# Patient Record
Sex: MALE | Race: WHITE | URBAN - METROPOLITAN AREA
[De-identification: names, ages, dates, MRNs, and addresses within clinical notes are randomized per-mention and may not be internally consistent; named-entity substitution may affect disease eponyms.]

---

## 2018-04-18 ENCOUNTER — TRANSFERRED RECORDS (OUTPATIENT)
Dept: HEALTH INFORMATION MANAGEMENT | Facility: CLINIC | Age: 83
End: 2018-04-18

## 2018-05-04 ENCOUNTER — ANESTHESIA EVENT (OUTPATIENT)
Dept: SURGERY | Facility: CLINIC | Age: 83
End: 2018-05-04
Payer: MEDICARE

## 2018-05-04 ENCOUNTER — HOSPITAL ENCOUNTER (OUTPATIENT)
Facility: CLINIC | Age: 83
Discharge: HOME | End: 2018-05-04
Attending: INTERNAL MEDICINE | Admitting: INTERNAL MEDICINE
Payer: MEDICARE

## 2018-05-04 ENCOUNTER — ANESTHESIA (OUTPATIENT)
Dept: SURGERY | Facility: CLINIC | Age: 83
End: 2018-05-04
Payer: MEDICARE

## 2018-05-04 VITALS
HEART RATE: 83 BPM | RESPIRATION RATE: 16 BRPM | TEMPERATURE: 97.6 F | WEIGHT: 118 LBS | OXYGEN SATURATION: 97 % | SYSTOLIC BLOOD PRESSURE: 107 MMHG | DIASTOLIC BLOOD PRESSURE: 59 MMHG | BODY MASS INDEX: 18.52 KG/M2 | HEIGHT: 67 IN

## 2018-05-04 LAB — UPPER GI ENDOSCOPY: NORMAL

## 2018-05-04 PROCEDURE — 71000027 ZZH RECOVERY PHASE 2 EACH 15 MINS: Performed by: INTERNAL MEDICINE

## 2018-05-04 PROCEDURE — 88305 TISSUE EXAM BY PATHOLOGIST: CPT | Performed by: INTERNAL MEDICINE

## 2018-05-04 PROCEDURE — 25000125 ZZHC RX 250: Performed by: ANESTHESIOLOGY

## 2018-05-04 PROCEDURE — 40000306 ZZH STATISTIC PRE PROC ASSESS II: Performed by: INTERNAL MEDICINE

## 2018-05-04 PROCEDURE — 25000125 ZZHC RX 250: Performed by: NURSE ANESTHETIST, CERTIFIED REGISTERED

## 2018-05-04 PROCEDURE — 88305 TISSUE EXAM BY PATHOLOGIST: CPT | Mod: 26 | Performed by: INTERNAL MEDICINE

## 2018-05-04 PROCEDURE — 36000052 ZZH SURGERY LEVEL 2 EA 15 ADDTL MIN: Performed by: INTERNAL MEDICINE

## 2018-05-04 PROCEDURE — 27210794 ZZH OR GENERAL SUPPLY STERILE: Performed by: INTERNAL MEDICINE

## 2018-05-04 PROCEDURE — 37000009 ZZH ANESTHESIA TECHNICAL FEE, EACH ADDTL 15 MIN: Performed by: INTERNAL MEDICINE

## 2018-05-04 PROCEDURE — 36000050 ZZH SURGERY LEVEL 2 1ST 30 MIN: Performed by: INTERNAL MEDICINE

## 2018-05-04 PROCEDURE — 25000128 H RX IP 250 OP 636: Performed by: NURSE ANESTHETIST, CERTIFIED REGISTERED

## 2018-05-04 PROCEDURE — 40000063 ZZH STATISTIC EGD (OR PROCEDURE): Performed by: INTERNAL MEDICINE

## 2018-05-04 PROCEDURE — 37000008 ZZH ANESTHESIA TECHNICAL FEE, 1ST 30 MIN: Performed by: INTERNAL MEDICINE

## 2018-05-04 PROCEDURE — 25000128 H RX IP 250 OP 636: Performed by: INTERNAL MEDICINE

## 2018-05-04 PROCEDURE — 25000128 H RX IP 250 OP 636: Performed by: ANESTHESIOLOGY

## 2018-05-04 RX ORDER — FENTANYL CITRATE 50 UG/ML
INJECTION, SOLUTION INTRAMUSCULAR; INTRAVENOUS PRN
Status: DISCONTINUED | OUTPATIENT
Start: 2018-05-04 | End: 2018-05-04

## 2018-05-04 RX ORDER — NALOXONE HYDROCHLORIDE 0.4 MG/ML
.1-.4 INJECTION, SOLUTION INTRAMUSCULAR; INTRAVENOUS; SUBCUTANEOUS
Status: DISCONTINUED | OUTPATIENT
Start: 2018-05-04 | End: 2018-05-04 | Stop reason: HOSPADM

## 2018-05-04 RX ORDER — FLUMAZENIL 0.1 MG/ML
0.2 INJECTION, SOLUTION INTRAVENOUS
Status: DISCONTINUED | OUTPATIENT
Start: 2018-05-04 | End: 2018-05-04 | Stop reason: HOSPADM

## 2018-05-04 RX ORDER — EPHEDRINE SULFATE 50 MG/ML
INJECTION, SOLUTION INTRAMUSCULAR; INTRAVENOUS; SUBCUTANEOUS PRN
Status: DISCONTINUED | OUTPATIENT
Start: 2018-05-04 | End: 2018-05-04

## 2018-05-04 RX ORDER — PROPOFOL 10 MG/ML
INJECTION, EMULSION INTRAVENOUS PRN
Status: DISCONTINUED | OUTPATIENT
Start: 2018-05-04 | End: 2018-05-04

## 2018-05-04 RX ORDER — ONDANSETRON 2 MG/ML
4 INJECTION INTRAMUSCULAR; INTRAVENOUS EVERY 30 MIN PRN
Status: DISCONTINUED | OUTPATIENT
Start: 2018-05-04 | End: 2018-05-04 | Stop reason: HOSPADM

## 2018-05-04 RX ORDER — HYDROMORPHONE HYDROCHLORIDE 1 MG/ML
.3-.5 INJECTION, SOLUTION INTRAMUSCULAR; INTRAVENOUS; SUBCUTANEOUS EVERY 10 MIN PRN
Status: DISCONTINUED | OUTPATIENT
Start: 2018-05-04 | End: 2018-05-04 | Stop reason: HOSPADM

## 2018-05-04 RX ORDER — ONDANSETRON 2 MG/ML
4 INJECTION INTRAMUSCULAR; INTRAVENOUS EVERY 6 HOURS PRN
Status: DISCONTINUED | OUTPATIENT
Start: 2018-05-04 | End: 2018-05-04 | Stop reason: HOSPADM

## 2018-05-04 RX ORDER — ONDANSETRON 4 MG/1
4 TABLET, ORALLY DISINTEGRATING ORAL EVERY 30 MIN PRN
Status: DISCONTINUED | OUTPATIENT
Start: 2018-05-04 | End: 2018-05-04 | Stop reason: HOSPADM

## 2018-05-04 RX ORDER — FENTANYL CITRATE 50 UG/ML
25-50 INJECTION, SOLUTION INTRAMUSCULAR; INTRAVENOUS
Status: DISCONTINUED | OUTPATIENT
Start: 2018-05-04 | End: 2018-05-04 | Stop reason: HOSPADM

## 2018-05-04 RX ORDER — MEPERIDINE HYDROCHLORIDE 25 MG/ML
12.5 INJECTION INTRAMUSCULAR; INTRAVENOUS; SUBCUTANEOUS
Status: DISCONTINUED | OUTPATIENT
Start: 2018-05-04 | End: 2018-05-04 | Stop reason: HOSPADM

## 2018-05-04 RX ORDER — KETAMINE HYDROCHLORIDE 10 MG/ML
INJECTION INTRAMUSCULAR; INTRAVENOUS PRN
Status: DISCONTINUED | OUTPATIENT
Start: 2018-05-04 | End: 2018-05-04

## 2018-05-04 RX ORDER — DEXAMETHASONE SODIUM PHOSPHATE 4 MG/ML
INJECTION, SOLUTION INTRA-ARTICULAR; INTRALESIONAL; INTRAMUSCULAR; INTRAVENOUS; SOFT TISSUE PRN
Status: DISCONTINUED | OUTPATIENT
Start: 2018-05-04 | End: 2018-05-04

## 2018-05-04 RX ORDER — SODIUM CHLORIDE, SODIUM LACTATE, POTASSIUM CHLORIDE, CALCIUM CHLORIDE 600; 310; 30; 20 MG/100ML; MG/100ML; MG/100ML; MG/100ML
INJECTION, SOLUTION INTRAVENOUS CONTINUOUS
Status: DISCONTINUED | OUTPATIENT
Start: 2018-05-04 | End: 2018-05-04 | Stop reason: HOSPADM

## 2018-05-04 RX ORDER — PROPOFOL 10 MG/ML
INJECTION, EMULSION INTRAVENOUS CONTINUOUS PRN
Status: DISCONTINUED | OUTPATIENT
Start: 2018-05-04 | End: 2018-05-04

## 2018-05-04 RX ORDER — ONDANSETRON 4 MG/1
4 TABLET, ORALLY DISINTEGRATING ORAL EVERY 6 HOURS PRN
Status: DISCONTINUED | OUTPATIENT
Start: 2018-05-04 | End: 2018-05-04 | Stop reason: HOSPADM

## 2018-05-04 RX ORDER — LIDOCAINE 40 MG/G
CREAM TOPICAL
Status: DISCONTINUED | OUTPATIENT
Start: 2018-05-04 | End: 2018-05-04 | Stop reason: HOSPADM

## 2018-05-04 RX ORDER — ONDANSETRON 2 MG/ML
4 INJECTION INTRAMUSCULAR; INTRAVENOUS
Status: COMPLETED | OUTPATIENT
Start: 2018-05-04 | End: 2018-05-04

## 2018-05-04 RX ORDER — HYDRALAZINE HYDROCHLORIDE 20 MG/ML
2.5-5 INJECTION INTRAMUSCULAR; INTRAVENOUS EVERY 10 MIN PRN
Status: DISCONTINUED | OUTPATIENT
Start: 2018-05-04 | End: 2018-05-04 | Stop reason: HOSPADM

## 2018-05-04 RX ADMIN — Medication 10 MG: at 11:10

## 2018-05-04 RX ADMIN — MIDAZOLAM 1 MG: 1 INJECTION INTRAMUSCULAR; INTRAVENOUS at 10:48

## 2018-05-04 RX ADMIN — Medication 15 MG: at 11:25

## 2018-05-04 RX ADMIN — ONDANSETRON 4 MG: 2 INJECTION INTRAMUSCULAR; INTRAVENOUS at 10:53

## 2018-05-04 RX ADMIN — PROPOFOL 20 MG: 10 INJECTION, EMULSION INTRAVENOUS at 10:55

## 2018-05-04 RX ADMIN — FENTANYL CITRATE 25 MCG: 50 INJECTION, SOLUTION INTRAMUSCULAR; INTRAVENOUS at 10:59

## 2018-05-04 RX ADMIN — SODIUM CHLORIDE, POTASSIUM CHLORIDE, SODIUM LACTATE AND CALCIUM CHLORIDE: 600; 310; 30; 20 INJECTION, SOLUTION INTRAVENOUS at 10:48

## 2018-05-04 RX ADMIN — PROPOFOL 10 MG: 10 INJECTION, EMULSION INTRAVENOUS at 10:57

## 2018-05-04 RX ADMIN — PROPOFOL 10 MG: 10 INJECTION, EMULSION INTRAVENOUS at 10:59

## 2018-05-04 RX ADMIN — DEXAMETHASONE SODIUM PHOSPHATE 4 MG: 4 INJECTION, SOLUTION INTRA-ARTICULAR; INTRALESIONAL; INTRAMUSCULAR; INTRAVENOUS; SOFT TISSUE at 10:53

## 2018-05-04 RX ADMIN — Medication 10 MG: at 11:15

## 2018-05-04 RX ADMIN — LIDOCAINE HYDROCHLORIDE 30 MG: 10 INJECTION, SOLUTION EPIDURAL; INFILTRATION; INTRACAUDAL; PERINEURAL at 10:49

## 2018-05-04 RX ADMIN — MIDAZOLAM 0.5 MG: 1 INJECTION INTRAMUSCULAR; INTRAVENOUS at 10:59

## 2018-05-04 RX ADMIN — Medication 10 MG: at 11:20

## 2018-05-04 RX ADMIN — PROPOFOL 75 MCG/KG/MIN: 10 INJECTION, EMULSION INTRAVENOUS at 10:48

## 2018-05-04 RX ADMIN — FENTANYL CITRATE 50 MCG: 50 INJECTION, SOLUTION INTRAMUSCULAR; INTRAVENOUS at 10:48

## 2018-05-04 RX ADMIN — KETAMINE HYDROCHLORIDE 20 MG: 10 INJECTION, SOLUTION INTRAMUSCULAR; INTRAVENOUS at 10:49

## 2018-05-04 ASSESSMENT — COPD QUESTIONNAIRES: COPD: 1

## 2018-05-04 NOTE — ANESTHESIA PREPROCEDURE EVALUATION
Anesthesia Evaluation     . Pt has had prior anesthetic. Type: General    No history of anesthetic complications          ROS/MED HX    ENT/Pulmonary:     (+)COPD, , . .    Neurologic:  - neg neurologic ROS     Cardiovascular:     (+) hypertension----. : . . . :. .       METS/Exercise Tolerance:     Hematologic:  - neg hematologic  ROS       Musculoskeletal:  - neg musculoskeletal ROS       GI/Hepatic:  - neg GI/hepatic ROS       Renal/Genitourinary:  - ROS Renal section negative       Endo:  - neg endo ROS       Psychiatric:  - neg psychiatric ROS       Infectious Disease:  - neg infectious disease ROS       Malignancy:      - no malignancy   Other:    - neg other ROS                 Physical Exam  Normal systems: cardiovascular, pulmonary and dental    Airway   Mallampati: II  TM distance: >3 FB  Neck ROM: full    Dental     Cardiovascular       Pulmonary                     Anesthesia Plan      History & Physical Review  History and physical reviewed and following examination; no interval change.    ASA Status:  2 .    NPO Status:  > 8 hours    Plan for MAC   PONV prophylaxis:  Ondansetron (or other 5HT-3)       Postoperative Care  Postoperative pain management:  IV analgesics and Oral pain medications.      Consents  Anesthetic plan, risks, benefits and alternatives discussed with:  Patient or representative and Patient..                          .

## 2018-05-04 NOTE — ANESTHESIA CARE TRANSFER NOTE
Patient: Hussein Hayes    Procedure(s):  ESOPHAGOSCOPY, GASTROSCOPY, DUODENOSCOPY (EGD) ([facility]), Duodenal mass biopsies  - Wound Class: II-Clean Contaminated    Diagnosis: Mass   Diagnosis Additional Information: No value filed.    Anesthesia Type:   MAC     Note:  Airway :Nasal Cannula  Patient transferred to:Phase II  Handoff Report: Identifed the Patient, Identified the Reponsible Provider, Reviewed the pertinent medical history, Discussed the surgical course, Reviewed Intra-OP anesthesia mangement and issues during anesthesia, Set expectations for post-procedure period and Allowed opportunity for questions and acknowledgement of understanding      Vitals: (Last set prior to Anesthesia Care Transfer)    CRNA VITALS  5/4/2018 1047 - 5/4/2018 1125      5/4/2018             Pulse: 61    SpO2: 99 %                Electronically Signed By: KALEB Stewart CRNA  May 4, 2018  11:25 AM

## 2018-05-04 NOTE — ANESTHESIA POSTPROCEDURE EVALUATION
Patient: Hussein Hayes    Procedure(s):  ESOPHAGOSCOPY, GASTROSCOPY, DUODENOSCOPY (EGD) ([facility]), Duodenal mass biopsies  - Wound Class: II-Clean Contaminated    Diagnosis:Mass   Diagnosis Additional Information: - Normal esophagus.   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     Anesthesia Type:  MAC    Note:  Anesthesia Post Evaluation    Patient location during evaluation: PACU  Patient participation: Able to fully participate in evaluation  Level of consciousness: awake and alert  Pain management: adequate  Airway patency: patent  Cardiovascular status: acceptable  Respiratory status: acceptable  Hydration status: acceptable  PONV: none     Anesthetic complications: None          Last vitals:  Vitals:    05/04/18 1150 05/04/18 1200 05/04/18 1234   BP: 112/59 113/64 107/59   Pulse:      Resp: 15 16 16   Temp:  97.6  F (36.4  C)    SpO2: 94% 95% 97%         Electronically Signed By: Corky Zaidi MD  May 4, 2018  1:07 PM

## 2018-05-04 NOTE — IP AVS SNAPSHOT
MRN:[id number]                      After Visit Summary   5/4/2018    Hussein Hayes    MRN: [id number]           Thank you!     Thank you for choosing [facility] for your care. Our goal is always to provide you with excellent care. Hearing back from our patients is one way we can continue to improve our services. Please take a few minutes to complete the written survey that you may receive in the mail after you visit. If you would like to speak to someone directly about your visit please contact Patient Relations at [phone]. Thank you!          Patient Information     Date Of Birth          1935        Designated Caregiver       Most Recent Value    Caregiver    Will someone help with your care after discharge? yes    Name of designated caregiver wife and daughter    Phone number of caregiver home      About your hospital stay     You were admitted on:  May 4, 2018 You last received care in the:  [facility] PreOP/PostOP    You were discharged on:  May 4, 2018       Who to Call     For medical emergencies, please call 911.  For non-urgent questions about your medical care, please call your primary care provider or clinic, None  For questions related to your surgery, please call your surgery clinic        Attending Provider     Provider Specialty    Dioni, Randall MILTON MD Gastroenterology       Primary Care Provider Fax #    [facility] [phone]      Further instructions from your care team       DR. RANDALL NARANJO M.D.   CLINIC PHONE NUMBER:  [phone]      ESOPHAGOGASTRODUODENOSCOPY DISCHARGE INSTRUCTIONS    You may not drive, use heavy equipment or consume alcohol for 24 hours because the drugs you were given may cause dizziness, drowsiness, forgetfulness and slower reaction time.    Small pieces or tissue (biopsies) or polyps may have been removed.    You may resume your regular diet and medications. Exception: If you had a biopsy or  polypectomy, do not take aspirin, aleve (naproxen) or ibuprofen for the next 10 days.  Tylenol (acetaminophen) is safe to take.    Additional instructions:  If you had a biopsy or polypectomy, the pathology report will be sent to your doctor.  If you have not received the results within 10 days, call your doctor's office.    What to watch for:  Problems rarely occur after the procedure.  It is important for you to be aware of the early signs of a possible complication.  Call immediately if you notice any of the followin.  Unusual pain or difficulty swallowing.    2.  Unusual abdominal or chest pain.    3.  Vomiting of blood.    4.  Black or bloody stools.    5.  Temperature above 100.6 degrees F         GENERAL ANESTHESIA OR SEDATION ADULT DISCHARGE INSTRUCTIONS   SPECIAL PRECAUTIONS FOR 24 HOURS AFTER SURGERY    IT IS NOT UNUSUAL TO FEEL LIGHT-HEADED OR FAINT, UP TO 24 HOURS AFTER SURGERY OR WHILE TAKING PAIN MEDICATION.  IF YOU HAVE THESE SYMPTOMS; SIT FOR A FEW MINUTES BEFORE STANDING AND HAVE SOMEONE ASSIST YOU WHEN YOU GET UP TO WALK OR USE THE BATHROOM.    YOU SHOULD REST AND RELAX FOR THE NEXT 24 HOURS AND YOU MUST MAKE ARRANGEMENTS TO HAVE SOMEONE STAY WITH YOU FOR AT LEAST 24 HOURS AFTER YOUR DISCHARGE.  AVOID HAZARDOUS AND STRENUOUS ACTIVITIES.  DO NOT MAKE IMPORTANT DECISIONS FOR 24 HOURS.    DO NOT DRIVE ANY VEHICLE OR OPERATE MECHANICAL EQUIPMENT FOR 24 HOURS FOLLOWING THE END OF YOUR SURGERY.  EVEN THOUGH YOU MAY FEEL NORMAL, YOUR REACTIONS MAY BE AFFECTED BY THE MEDICATION YOU HAVE RECEIVED.    DO NOT DRINK ALCOHOLIC BEVERAGES FOR 24 HOURS FOLLOWING YOUR SURGERY.    DRINK CLEAR LIQUIDS (APPLE JUICE, GINGER ALE, 7-UP, BROTH, ETC.).  PROGRESS TO YOUR REGULAR DIET AS YOU FEEL ABLE.    YOU MAY HAVE A DRY MOUTH, A SORE THROAT, MUSCLES ACHES OR TROUBLE SLEEPING.  THESE SHOULD GO AWAY AFTER 24 HOURS.    CALL YOUR DOCTOR FOR ANY OF THE FOLLOWING:  SIGNS OF INFECTION (FEVER, GROWING TENDERNESS AT THE  "SURGERY SITE, A LARGE AMOUNT OF DRAINAGE OR BLEEDING, SEVERE PAIN, FOUL-SMELLING DRAINAGE, REDNESS OR SWELLING.    IT HAS BEEN OVER 8 TO 10 HOURS SINCE SURGERY AND YOU ARE STILL NOT ABLE TO URINATE (PASS WATER).       Pending Results     Date and Time Order Name Status Description    2018 1101 Surgical pathology exam In process             Admission Information     Date & Time Provider Department Dept. Phone    2018 Link, Randall MILTON MD [facility] PreOP/PostOP [phone]      Your Vitals Were     Blood Pressure Pulse Temperature Respirations Height Weight    115/50 83 97.2  F (36.2  C) (Temporal) 13 1.702 m (5' 7\") 53.5 kg (118 lb)    Pulse Oximetry BMI (Body Mass Index)                99% 18.48 kg/m2          StatusPagehart Information     Sviral lets you send messages to your doctor, view your test results, renew your prescriptions, schedule appointments and more. To sign up, go to [email or web address] . Click on \"Log in\" on the left side of the screen, which will take you to the Welcome page. Then click on \"Sign up Now\" on the right side of the page.     You will be asked to enter the access code listed below, as well as some personal information. Please follow the directions to create your username and password.     Your access code is: [email or web address]  Expires: 2018 11:36 AM     Your access code will  in 90 days. If you need help or a new code, please call your [place] clinic or [phone].        Care EveryWhere ID     This is your Care EveryWhere ID. This could be used by other organizations to access your [place] medical records  [id number]        Equal Access to Services     CHEMA MAYER : Hadii priyank Flanagan, leah oshea, clara pierce. So [facility] [phone].    ATENCIÓN: Si habla español, tiene a coffey disposición servicios gratuitos de asistencia lingüística. Llame al [phone].    We comply with applicable federal " civil rights laws and [place] laws. We do not discriminate on the basis of race, color, national origin, age, disability, sex, sexual orientation, or gender identity.               Review of your medicines      UNREVIEWED medicines. Ask your doctor about these medicines        Dose / Directions    LISINOPRIL PO        Dose:  25 mg   Take 25 mg by mouth daily   Refills:  0       umeclidinium-vilanterol 62.5-25 MCG/INH oral inhaler   Commonly known as:  ANORO ELLIPTA        Dose:  1 puff   Inhale 1 puff into the lungs daily   Refills:  0                Protect others around you: Learn how to safely use, store and throw away your medicines at www.disposemymeds.org.             Medication List: This is a list of all your medications and when to take them. Check marks below indicate your daily home schedule. Keep this list as a reference.      Medications           Morning Afternoon Evening Bedtime As Needed    LISINOPRIL PO   Take 25 mg by mouth daily                                umeclidinium-vilanterol 62.5-25 MCG/INH oral inhaler   Commonly known as:  ANORO ELLIPTA   Inhale 1 puff into the lungs daily

## 2018-05-04 NOTE — DISCHARGE INSTRUCTIONS
DR. FARHAN NARANJO M.D.   CLINIC PHONE NUMBER:  [phone]      ESOPHAGOGASTRODUODENOSCOPY DISCHARGE INSTRUCTIONS    You may not drive, use heavy equipment or consume alcohol for 24 hours because the drugs you were given may cause dizziness, drowsiness, forgetfulness and slower reaction time.    Small pieces or tissue (biopsies) or polyps may have been removed.    You may resume your regular diet and medications. Exception: If you had a biopsy or polypectomy, do not take aspirin, aleve (naproxen) or ibuprofen for the next 10 days.  Tylenol (acetaminophen) is safe to take.    Additional instructions:  If you had a biopsy or polypectomy, the pathology report will be sent to your doctor.  If you have not received the results within 10 days, call your doctor's office.    What to watch for:  Problems rarely occur after the procedure.  It is important for you to be aware of the early signs of a possible complication.  Call immediately if you notice any of the followin.  Unusual pain or difficulty swallowing.    2.  Unusual abdominal or chest pain.    3.  Vomiting of blood.    4.  Black or bloody stools.    5.  Temperature above 100.6 degrees F         GENERAL ANESTHESIA OR SEDATION ADULT DISCHARGE INSTRUCTIONS   SPECIAL PRECAUTIONS FOR 24 HOURS AFTER SURGERY    IT IS NOT UNUSUAL TO FEEL LIGHT-HEADED OR FAINT, UP TO 24 HOURS AFTER SURGERY OR WHILE TAKING PAIN MEDICATION.  IF YOU HAVE THESE SYMPTOMS; SIT FOR A FEW MINUTES BEFORE STANDING AND HAVE SOMEONE ASSIST YOU WHEN YOU GET UP TO WALK OR USE THE BATHROOM.    YOU SHOULD REST AND RELAX FOR THE NEXT 24 HOURS AND YOU MUST MAKE ARRANGEMENTS TO HAVE SOMEONE STAY WITH YOU FOR AT LEAST 24 HOURS AFTER YOUR DISCHARGE.  AVOID HAZARDOUS AND STRENUOUS ACTIVITIES.  DO NOT MAKE IMPORTANT DECISIONS FOR 24 HOURS.    DO NOT DRIVE ANY VEHICLE OR OPERATE MECHANICAL EQUIPMENT FOR 24 HOURS FOLLOWING THE END OF YOUR SURGERY.  EVEN THOUGH YOU MAY FEEL NORMAL, YOUR REACTIONS MAY BE  AFFECTED BY THE MEDICATION YOU HAVE RECEIVED.    DO NOT DRINK ALCOHOLIC BEVERAGES FOR 24 HOURS FOLLOWING YOUR SURGERY.    DRINK CLEAR LIQUIDS (APPLE JUICE, GINGER ALE, 7-UP, BROTH, ETC.).  PROGRESS TO YOUR REGULAR DIET AS YOU FEEL ABLE.    YOU MAY HAVE A DRY MOUTH, A SORE THROAT, MUSCLES ACHES OR TROUBLE SLEEPING.  THESE SHOULD GO AWAY AFTER 24 HOURS.    CALL YOUR DOCTOR FOR ANY OF THE FOLLOWING:  SIGNS OF INFECTION (FEVER, GROWING TENDERNESS AT THE SURGERY SITE, A LARGE AMOUNT OF DRAINAGE OR BLEEDING, SEVERE PAIN, FOUL-SMELLING DRAINAGE, REDNESS OR SWELLING.    IT HAS BEEN OVER 8 TO 10 HOURS SINCE SURGERY AND YOU ARE STILL NOT ABLE TO URINATE (PASS WATER).

## 2018-05-04 NOTE — IP AVS SNAPSHOT
[facility] PreOP/PostOP    [address]    [facility] [address]    Phone:  [phone]    Fax:  [phone]                                       After Visit Summary   5/4/2018    Hussein Hayes    MRN: [id number]           After Visit Summary Signature Page     I have received my discharge instructions, and my questions have been answered. I have discussed any challenges I see with this plan with the nurse or doctor.    ..........................................................................................................................................  Patient/Patient Representative Signature      ..........................................................................................................................................  Patient Representative Print Name and Relationship to Patient    ..................................................               ................................................  Date                                            Time    ..........................................................................................................................................  Reviewed by Signature/Title    ...................................................              ..............................................  Date                                                            Time

## 2018-05-07 LAB — COPATH REPORT: NORMAL

## 2018-05-09 ENCOUNTER — OFFICE VISIT (OUTPATIENT)
Dept: SURGERY | Facility: CLINIC | Age: 83
End: 2018-05-09
Payer: COMMERCIAL

## 2018-05-09 VITALS
BODY MASS INDEX: 18.52 KG/M2 | DIASTOLIC BLOOD PRESSURE: 58 MMHG | HEART RATE: 75 BPM | WEIGHT: 118 LBS | SYSTOLIC BLOOD PRESSURE: 100 MMHG | HEIGHT: 67 IN

## 2018-05-09 DIAGNOSIS — C17.0 ADENOCARCINOMA OF DUODENUM (H): Primary | ICD-10-CM

## 2018-05-09 PROCEDURE — 99204 OFFICE O/P NEW MOD 45 MIN: CPT | Performed by: SURGERY

## 2018-05-09 NOTE — MR AVS SNAPSHOT
"              After Visit Summary   2018    Hussein Hayes    MRN: [id number]           Patient Information     Date Of Birth          1935        Visit Information        Provider Department      2018 12:45 PM Kenneth Berg MD [facility] General Surgery       Follow-ups after your visit        Who to contact     If you have questions or need follow up information about today's clinic visit or your schedule please contact [facility] directly at [phone].  Normal or non-critical lab and imaging results will be communicated to you by [facility], letter or phone within 4 business days after the clinic has received the results. If you do not hear from us within 7 days, please contact the clinic through HQ plust or phone. If you have a critical or abnormal lab result, we will notify you by phone as soon as possible.  Submit refill requests through [employer] or call your pharmacy and they will forward the refill request to us. Please allow 3 business days for your refill to be completed.          Additional Information About Your Visit        MyChart Information     Con[employer]rics lets you send messages to your doctor, view your test results, renew your prescriptions, schedule appointments and more. To sign up, go to [email or web address] . Click on \"Log in\" on the left side of the screen, which will take you to the Welcome page. Then click on \"Sign up Now\" on the right side of the page.     You will be asked to enter the access code listed below, as well as some personal information. Please follow the directions to create your username and password.     Your access code is: [email or web address]  Expires: 2018 11:36 AM     Your access code will  in 90 days. If you need help or a new code, please call your [place] clinic or [phone].        Care EveryWhere ID     This is your Care EveryWhere ID. This could be used by other organizations to " "access your [place] medical records  [id number]        Your Vitals Were     Pulse Height BMI (Body Mass Index)             75 5' 7\" (1.702 m) 18.48 kg/m2          Blood Pressure from Last 3 Encounters:   05/09/18 100/58   05/04/18 107/59    Weight from Last 3 Encounters:   05/09/18 118 lb (53.5 kg)   05/04/18 118 lb (53.5 kg)              Today, you had the following     No orders found for display       Primary Care Provider Office Phone # Fax #    Kenneth G Pallas, [phone]       [facility] [id number] [facility] [address]        Equal Access to Services     ELOISA MAYER : Hadii priyank Flanagan, wagato oshea, qaybta kaalmada adeumuyaconsuelo, clara khan. So [facility] [phone].    ATENCIÓN: Si habla español, tiene a coffey disposición servicios gratuitos de asistencia lingüística. Llame al [phone].    We comply with applicable federal civil rights laws and [place] laws. We do not discriminate on the basis of race, color, national origin, age, disability, sex, sexual orientation, or gender identity.            Thank you!     Thank you for choosing [facility]  for your care. Our goal is always to provide you with excellent care. Hearing back from our patients is one way we can continue to improve our services. Please take a few minutes to complete the written survey that you may receive in the mail after your visit with us. Thank you!             Your Updated Medication List - Protect others around you: Learn how to safely use, store and throw away your medicines at www.disposemymeds.org.          This list is accurate as of 5/9/18  1:27 PM.  Always use your most recent med list.                   Brand Name Dispense Instructions for use Diagnosis    LISINOPRIL PO      Take 25 mg by mouth daily        umeclidinium-vilanterol 62.5-25 MCG/INH oral inhaler    ANORO ELLIPTA     Inhale 1 puff into the lungs daily        "

## 2018-05-09 NOTE — LETTER
"May 9, 2018    RE: Hussein Arellano, : 1935         HISTORY OF PRESENT ILLNESS:  Hussein Hayes is a 82 year old male who is seen in consultation at the request of Dr. Wheatley for evaluation of gastric outlet obstruction due to duodenal mass.  He began having symptoms shortly after Easter, nausea and vomits.  He has lost about 10-15 pounds.  CT and EGD shows duodenal mass, third portion, adenocarcinoma.  He is here today to discuss excision.     REVIEW OF SYSTEMS:  Constitutional:  Negative for chills, fatigue, fever.  Positive for weight loss.  Eyes:  Negative for new vision problems.  ENT:  Negative for ENT pain.  Cardiovascular:  Negative for chest pain, palpitations.  Respiratory:  Negative for cough, dyspnea.  Gastrointestinal:  Positive for abdominal pain, nausea, vomits  Musculoskeletal:  Negative for new arthralgias or myalgias.  Integumentary:  No rashes nor masses.     Family History has been reviewed.     There is no problem list on file for this patient.     Vtals: /58  Pulse 75  Ht 5' 7\" (1.702 m)  Wt 118 lb (53.5 kg)  BMI 18.48 kg/m2  BMI= Body mass index is 18.48 kg/(m^2).     EXAM:  GENERAL: healthy, alert and no distress   HEENT: moist mucus membranes, no scleral icterus  CARDIOVASCULAR:  RRR, No JVD  RESPIRATORY: non labored breathing  NECK: Neck supple. No noticeable masses.  ABDOMEN: soft, nontender,  Extremities: warm and well perfused, no edema  SKIN: No suspicious lesions or rashes      LABS/Imaging: reviewed EGD, CT and labs/path results with patient and family.     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  No current evidence of metastatic disease.     PLAN:  Pending oncology visit.  Likely proceed with abdominal exploration and distal duodenal resection, and feeding tube placement.     Hussein Hayes understands the risk, benefits, hopeful outcomes, and possible complications, both in the short and in the long term.  All his " questions answered, he will like to proceed with the propose procedure in the near future.     It is my pleasure to participate in the care of Hussein Hayes. Thank you for this consultation.      If you have any questions please give me a call.     Best regards,  MORGAN Nava

## 2018-05-12 NOTE — PROGRESS NOTES
[facility] Consultation    CHIEF COMPLAINT:  Chief Complaint   Patient presents with     Consult     Adenocarcinoma Duodenum mass       HISTORY OF PRESENT ILLNESS:  Hussein Hayes is a 82 year old male who is seen in consultation at the request of Dr. Wheatley for evaluation of gastric outlet obstruction due to duodenal mass.  He began having symptoms shortly after Easter, nausea and vomits.  He has lost about 10-15 pounds.  CT and EGD shows duodenal mass, third portion, adenocarcinoma.  He is here today to discuss excision.    REVIEW OF SYSTEMS:  Constitutional:  Negative for chills, fatigue, fever.  Positive for weight loss.  Eyes:  Negative for new vision problems.  ENT:  Negative for ENT pain.  Cardiovascular:  Negative for chest pain, palpitations.  Respiratory:  Negative for cough, dyspnea.  Gastrointestinal:  Positive for abdominal pain, nausea, vomits  Musculoskeletal:  Negative for new arthralgias or myalgias.  Integumentary:  No rashes nor masses.    Past Medical History:   Diagnosis Date     COPD (chronic obstructive pulmonary disease) (H)      Hypertension     No cardiologist       Past Surgical History:   Procedure Laterality Date     APPENDECTOMY       CATARACT IOL, RT/LT Bilateral      ESOPHAGOSCOPY, GASTROSCOPY, DUODENOSCOPY (EGD), COMBINED N/A 5/4/2018    Procedure: COMBINED ESOPHAGOSCOPY, GASTROSCOPY, DUODENOSCOPY (EGD);  Esophagoscopy, gastroscopy, duodenoscopy and duodenal mass biopsies ;  Surgeon: Randall Wheatley MD;  Location: [facility] OR       Family History has been reviewed.    Social History   Substance Use Topics     Smoking status: Former Smoker     Packs/day: 1.00     Years: 40.00     Types: Cigarettes     Smokeless tobacco: Never Used      Comment: quit 12 years ago     Alcohol use Not on file       There is no problem list on file for this patient.      Allergies   Allergen Reactions     Penicillins Hives       Current Outpatient Prescriptions   Medication Sig  "Dispense Refill     LISINOPRIL PO Take 25 mg by mouth daily       umeclidinium-vilanterol (ANORO ELLIPTA) 62.5-25 MCG/INH oral inhaler Inhale 1 puff into the lungs daily         Vitals: /58  Pulse 75  Ht 5' 7\" (1.702 m)  Wt 118 lb (53.5 kg)  BMI 18.48 kg/m2  BMI= Body mass index is 18.48 kg/(m^2).    EXAM:  GENERAL: healthy, alert and no distress   HEENT: moist mucus membranes, no scleral icterus  CARDIOVASCULAR:  RRR, No JVD  RESPIRATORY: non labored breathing  NECK: Neck supple. No noticeable masses.  ABDOMEN: soft, nontender,  Extremities: warm and well perfused, no edema  SKIN: No suspicious lesions or rashes      LABS/Imaging: reviewed EGD, CT and labs/path results with patient and family.    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  No current evidence of metastatic disease.    PLAN:  Pending oncology visit.  Likely proceed with abdominal exploration and distal duodenal resection, and feeding tube placement.    Hussein Hayes understands the risk, benefits, hopeful outcomes, and possible complications, both in the short and in the long term.  All his questions answered, he will like to proceed with the propose procedure in the near future.    It is my pleasure to participate in the care of Hussein Hayes. Thank you for this consultation.     If you have any questions please give me a call.    Best regards,  Kenneth Berg MD    Please route or send letter to:  Primary Care Provider (PCP), Referring Provider and Include Progress Note    Total time with patient visit: 45 minutes more than half spent in counseling, explanation of procedures and coordination of care.    "

## 2018-05-15 ENCOUNTER — TELEPHONE (OUTPATIENT)
Dept: SURGERY | Facility: CLINIC | Age: 83
End: 2018-05-15

## 2018-05-15 NOTE — TELEPHONE ENCOUNTER
Name of caller: Meaghan-  Nurse from [facility]    Reason for Call:  Questions about surgery    Surgeon:  Dr. Berg    Recent Surgery:  No    If yes, when & what type:  N/A      Best phone number to reach pt at is: [phone]  Ok to leave a message with medical info? No    Pharmacy preferred (if calling for a refill): n/a

## 2018-05-15 NOTE — TELEPHONE ENCOUNTER
Spoke with Meaghan, nurse from oncology who reports that the patient's daughter called her reporting that the patient has been having multiple episodes of emesis.  He is not even able to eat soft foods.  On Friday, he had 7 episodes of emesis.    He is only able to handle one serving of pedialyte. The daughter is reporting that he is becoming increasingly weak and that she does not think she could get him to come in for fluids.    They are looking for any recommendations from Dr. Berg, or possibly to be able to have the surgery before the scheduled date of June 1, due to his current condition.    Informed Meaghan that this will be discussed with Dr. Berg and any recommendations will be passed along to the family as well as oncology if warranted.    Also informed her that will recommend Dr. Berg call and talk to the patient's daughter directly.    Bridgett Lyn RN

## 2018-05-15 NOTE — TELEPHONE ENCOUNTER
Type of surgery: Distal duodectomy  Location of surgery: [facility]  Date and time of surgery: 6/1/18 at 12:20pm  Surgeon: Dr. Kenneth Berg  Pre-Op Appt Date: Patient to schedule  Post-Op Appt Date: Patient to schedule   Packet sent out: Yes  Pre-cert/Authorization completed:  Not Applicable  Date: 5/14/18

## 2018-05-22 RX ORDER — LISINOPRIL AND HYDROCHLOROTHIAZIDE 20; 25 MG/1; MG/1
1 TABLET ORAL DAILY
COMMUNITY
End: 2018-06-02

## 2018-05-23 ENCOUNTER — ANESTHESIA (OUTPATIENT)
Dept: SURGERY | Facility: CLINIC | Age: 83
DRG: 327 | End: 2018-05-23
Payer: MEDICARE

## 2018-05-23 ENCOUNTER — TRANSFERRED RECORDS (OUTPATIENT)
Dept: HEALTH INFORMATION MANAGEMENT | Facility: CLINIC | Age: 83
End: 2018-05-23

## 2018-05-23 ENCOUNTER — ANESTHESIA EVENT (OUTPATIENT)
Dept: SURGERY | Facility: CLINIC | Age: 83
DRG: 327 | End: 2018-05-23
Payer: MEDICARE

## 2018-05-23 ENCOUNTER — HOSPITAL ENCOUNTER (INPATIENT)
Facility: CLINIC | Age: 83
LOS: 10 days | Discharge: HOME | DRG: 327 | End: 2018-06-02
Attending: SURGERY | Admitting: HOSPITALIST
Payer: MEDICARE

## 2018-05-23 ENCOUNTER — APPOINTMENT (OUTPATIENT)
Dept: SURGERY | Facility: PHYSICIAN GROUP | Age: 83
End: 2018-05-23
Payer: COMMERCIAL

## 2018-05-23 DIAGNOSIS — R33.9 URINARY RETENTION: Primary | ICD-10-CM

## 2018-05-23 DIAGNOSIS — N30.00 ACUTE CYSTITIS WITHOUT HEMATURIA: ICD-10-CM

## 2018-05-23 DIAGNOSIS — I48.0 PAF (PAROXYSMAL ATRIAL FIBRILLATION) (H): ICD-10-CM

## 2018-05-23 PROBLEM — C80.1: Status: ACTIVE | Noted: 2018-05-23

## 2018-05-23 LAB
CREAT SERPL-MCNC: 1.29 MG/DL (ref 0.66–1.25)
GFR SERPL CREATININE-BSD FRML MDRD: 53 ML/MIN/1.7M2
HGB BLD-MCNC: 13.2 G/DL (ref 13.3–17.7)
PLATELET # BLD AUTO: 267 10E9/L (ref 150–450)
POTASSIUM SERPL-SCNC: 3.8 MMOL/L (ref 3.4–5.3)

## 2018-05-23 PROCEDURE — 2894A VOIDCORRECT: CPT | Mod: AS | Performed by: PHYSICIAN ASSISTANT

## 2018-05-23 PROCEDURE — 0D160ZA BYPASS STOMACH TO JEJUNUM, OPEN APPROACH: ICD-10-PCS | Performed by: SURGERY

## 2018-05-23 PROCEDURE — 25000128 H RX IP 250 OP 636: Performed by: PHYSICIAN ASSISTANT

## 2018-05-23 PROCEDURE — 25000125 ZZHC RX 250: Performed by: ANESTHESIOLOGY

## 2018-05-23 PROCEDURE — 25000128 H RX IP 250 OP 636: Performed by: ANESTHESIOLOGY

## 2018-05-23 PROCEDURE — 25000132 ZZH RX MED GY IP 250 OP 250 PS 637: Mod: GY | Performed by: PHYSICIAN ASSISTANT

## 2018-05-23 PROCEDURE — 43820 GASTROJEJUNOSTOMY WO VAGOTMY: CPT | Mod: 51 | Performed by: PHYSICIAN ASSISTANT

## 2018-05-23 PROCEDURE — 88341 IMHCHEM/IMCYTCHM EA ADD ANTB: CPT | Mod: 26 | Performed by: SURGERY

## 2018-05-23 PROCEDURE — 25000125 ZZHC RX 250: Performed by: SURGERY

## 2018-05-23 PROCEDURE — 37000009 ZZH ANESTHESIA TECHNICAL FEE, EACH ADDTL 15 MIN: Performed by: SURGERY

## 2018-05-23 PROCEDURE — 82565 ASSAY OF CREATININE: CPT | Performed by: ANESTHESIOLOGY

## 2018-05-23 PROCEDURE — 25000125 ZZHC RX 250: Performed by: NURSE ANESTHETIST, CERTIFIED REGISTERED

## 2018-05-23 PROCEDURE — 43820 GASTROJEJUNOSTOMY WO VAGOTMY: CPT | Mod: 51 | Performed by: SURGERY

## 2018-05-23 PROCEDURE — 84132 ASSAY OF SERUM POTASSIUM: CPT | Performed by: ANESTHESIOLOGY

## 2018-05-23 PROCEDURE — 36000093 ZZH SURGERY LEVEL 4 1ST 30 MIN: Performed by: SURGERY

## 2018-05-23 PROCEDURE — 25000132 ZZH RX MED GY IP 250 OP 250 PS 637: Mod: GY | Performed by: SURGERY

## 2018-05-23 PROCEDURE — 85049 AUTOMATED PLATELET COUNT: CPT | Performed by: ANESTHESIOLOGY

## 2018-05-23 PROCEDURE — 88342 IMHCHEM/IMCYTCHM 1ST ANTB: CPT | Mod: 26 | Performed by: SURGERY

## 2018-05-23 PROCEDURE — 37000008 ZZH ANESTHESIA TECHNICAL FEE, 1ST 30 MIN: Performed by: SURGERY

## 2018-05-23 PROCEDURE — 93010 ELECTROCARDIOGRAM REPORT: CPT | Performed by: INTERNAL MEDICINE

## 2018-05-23 PROCEDURE — 71000012 ZZH RECOVERY PHASE 1 LEVEL 1 FIRST HR: Performed by: SURGERY

## 2018-05-23 PROCEDURE — 25000128 H RX IP 250 OP 636: Performed by: SURGERY

## 2018-05-23 PROCEDURE — 85018 HEMOGLOBIN: CPT | Performed by: ANESTHESIOLOGY

## 2018-05-23 PROCEDURE — 27210995 ZZH RX 272: Performed by: SURGERY

## 2018-05-23 PROCEDURE — 36415 COLL VENOUS BLD VENIPUNCTURE: CPT | Performed by: ANESTHESIOLOGY

## 2018-05-23 PROCEDURE — 0DBV0ZX EXCISION OF MESENTERY, OPEN APPROACH, DIAGNOSTIC: ICD-10-PCS | Performed by: SURGERY

## 2018-05-23 PROCEDURE — A9270 NON-COVERED ITEM OR SERVICE: HCPCS | Mod: GY | Performed by: PHYSICIAN ASSISTANT

## 2018-05-23 PROCEDURE — 88341 IMHCHEM/IMCYTCHM EA ADD ANTB: CPT | Performed by: SURGERY

## 2018-05-23 PROCEDURE — 93005 ELECTROCARDIOGRAM TRACING: CPT

## 2018-05-23 PROCEDURE — 40000170 ZZH STATISTIC PRE-PROCEDURE ASSESSMENT II: Performed by: SURGERY

## 2018-05-23 PROCEDURE — 2894A VOIDCORRECT: CPT | Performed by: SURGERY

## 2018-05-23 PROCEDURE — 12000007 ZZH R&B INTERMEDIATE

## 2018-05-23 PROCEDURE — 88331 PATH CONSLTJ SURG 1 BLK 1SPC: CPT | Performed by: SURGERY

## 2018-05-23 PROCEDURE — A9270 NON-COVERED ITEM OR SERVICE: HCPCS | Mod: GY | Performed by: SURGERY

## 2018-05-23 PROCEDURE — 88331 PATH CONSLTJ SURG 1 BLK 1SPC: CPT | Mod: 26 | Performed by: SURGERY

## 2018-05-23 PROCEDURE — 88305 TISSUE EXAM BY PATHOLOGIST: CPT | Performed by: SURGERY

## 2018-05-23 PROCEDURE — 25000128 H RX IP 250 OP 636: Performed by: NURSE ANESTHETIST, CERTIFIED REGISTERED

## 2018-05-23 PROCEDURE — 25000566 ZZH SEVOFLURANE, EA 15 MIN: Performed by: SURGERY

## 2018-05-23 PROCEDURE — 88305 TISSUE EXAM BY PATHOLOGIST: CPT | Mod: 26 | Performed by: SURGERY

## 2018-05-23 PROCEDURE — 27210794 ZZH OR GENERAL SUPPLY STERILE: Performed by: SURGERY

## 2018-05-23 PROCEDURE — 36000063 ZZH SURGERY LEVEL 4 EA 15 ADDTL MIN: Performed by: SURGERY

## 2018-05-23 PROCEDURE — 88342 IMHCHEM/IMCYTCHM 1ST ANTB: CPT | Performed by: SURGERY

## 2018-05-23 RX ORDER — LIDOCAINE 40 MG/G
CREAM TOPICAL
Status: DISCONTINUED | OUTPATIENT
Start: 2018-05-23 | End: 2018-06-02 | Stop reason: HOSPADM

## 2018-05-23 RX ORDER — LABETALOL HYDROCHLORIDE 5 MG/ML
10 INJECTION, SOLUTION INTRAVENOUS
Status: DISCONTINUED | OUTPATIENT
Start: 2018-05-23 | End: 2018-05-23 | Stop reason: HOSPADM

## 2018-05-23 RX ORDER — ONDANSETRON 2 MG/ML
INJECTION INTRAMUSCULAR; INTRAVENOUS PRN
Status: DISCONTINUED | OUTPATIENT
Start: 2018-05-23 | End: 2018-05-23

## 2018-05-23 RX ORDER — CIPROFLOXACIN 2 MG/ML
400 INJECTION, SOLUTION INTRAVENOUS SEE ADMIN INSTRUCTIONS
Status: DISCONTINUED | OUTPATIENT
Start: 2018-05-23 | End: 2018-05-23 | Stop reason: HOSPADM

## 2018-05-23 RX ORDER — AMOXICILLIN 250 MG
1 CAPSULE ORAL 2 TIMES DAILY
Status: DISCONTINUED | OUTPATIENT
Start: 2018-05-23 | End: 2018-06-02 | Stop reason: HOSPADM

## 2018-05-23 RX ORDER — PROCHLORPERAZINE MALEATE 5 MG
5 TABLET ORAL EVERY 6 HOURS PRN
Status: DISCONTINUED | OUTPATIENT
Start: 2018-05-23 | End: 2018-06-02 | Stop reason: HOSPADM

## 2018-05-23 RX ORDER — FENTANYL CITRATE 50 UG/ML
INJECTION, SOLUTION INTRAMUSCULAR; INTRAVENOUS PRN
Status: DISCONTINUED | OUTPATIENT
Start: 2018-05-23 | End: 2018-05-23

## 2018-05-23 RX ORDER — PROPOFOL 10 MG/ML
INJECTION, EMULSION INTRAVENOUS PRN
Status: DISCONTINUED | OUTPATIENT
Start: 2018-05-23 | End: 2018-05-23

## 2018-05-23 RX ORDER — DIPHENHYDRAMINE HCL 12.5MG/5ML
12.5 LIQUID (ML) ORAL EVERY 6 HOURS PRN
Status: DISCONTINUED | OUTPATIENT
Start: 2018-05-23 | End: 2018-06-02 | Stop reason: HOSPADM

## 2018-05-23 RX ORDER — MAGNESIUM HYDROXIDE 1200 MG/15ML
LIQUID ORAL PRN
Status: DISCONTINUED | OUTPATIENT
Start: 2018-05-23 | End: 2018-05-23 | Stop reason: HOSPADM

## 2018-05-23 RX ORDER — LISINOPRIL AND HYDROCHLOROTHIAZIDE 20; 25 MG/1; MG/1
1 TABLET ORAL DAILY
Status: DISCONTINUED | OUTPATIENT
Start: 2018-05-24 | End: 2018-05-28

## 2018-05-23 RX ORDER — NEOSTIGMINE METHYLSULFATE 1 MG/ML
VIAL (ML) INJECTION PRN
Status: DISCONTINUED | OUTPATIENT
Start: 2018-05-23 | End: 2018-05-23

## 2018-05-23 RX ORDER — DEXAMETHASONE SODIUM PHOSPHATE 4 MG/ML
INJECTION, SOLUTION INTRA-ARTICULAR; INTRALESIONAL; INTRAMUSCULAR; INTRAVENOUS; SOFT TISSUE PRN
Status: DISCONTINUED | OUTPATIENT
Start: 2018-05-23 | End: 2018-05-23

## 2018-05-23 RX ORDER — ACETAMINOPHEN 325 MG/1
650 TABLET ORAL EVERY 4 HOURS PRN
Status: DISCONTINUED | OUTPATIENT
Start: 2018-05-26 | End: 2018-06-02 | Stop reason: HOSPADM

## 2018-05-23 RX ORDER — ACETAMINOPHEN 325 MG/1
975 TABLET ORAL EVERY 8 HOURS
Status: DISPENSED | OUTPATIENT
Start: 2018-05-23 | End: 2018-05-26

## 2018-05-23 RX ORDER — SODIUM CHLORIDE, SODIUM LACTATE, POTASSIUM CHLORIDE, CALCIUM CHLORIDE 600; 310; 30; 20 MG/100ML; MG/100ML; MG/100ML; MG/100ML
INJECTION, SOLUTION INTRAVENOUS CONTINUOUS
Status: DISCONTINUED | OUTPATIENT
Start: 2018-05-23 | End: 2018-05-24

## 2018-05-23 RX ORDER — DIPHENHYDRAMINE HYDROCHLORIDE 50 MG/ML
12.5 INJECTION INTRAMUSCULAR; INTRAVENOUS EVERY 6 HOURS PRN
Status: DISCONTINUED | OUTPATIENT
Start: 2018-05-23 | End: 2018-06-02 | Stop reason: HOSPADM

## 2018-05-23 RX ORDER — GLYCOPYRROLATE 0.2 MG/ML
INJECTION, SOLUTION INTRAMUSCULAR; INTRAVENOUS PRN
Status: DISCONTINUED | OUTPATIENT
Start: 2018-05-23 | End: 2018-05-23

## 2018-05-23 RX ORDER — ONDANSETRON 4 MG/1
4 TABLET, ORALLY DISINTEGRATING ORAL EVERY 6 HOURS PRN
Status: DISCONTINUED | OUTPATIENT
Start: 2018-05-23 | End: 2018-06-02 | Stop reason: HOSPADM

## 2018-05-23 RX ORDER — ACETAMINOPHEN 325 MG/1
975 TABLET ORAL ONCE
Status: COMPLETED | OUTPATIENT
Start: 2018-05-23 | End: 2018-05-23

## 2018-05-23 RX ORDER — SODIUM CHLORIDE, SODIUM LACTATE, POTASSIUM CHLORIDE, CALCIUM CHLORIDE 600; 310; 30; 20 MG/100ML; MG/100ML; MG/100ML; MG/100ML
INJECTION, SOLUTION INTRAVENOUS CONTINUOUS
Status: DISCONTINUED | OUTPATIENT
Start: 2018-05-23 | End: 2018-05-23 | Stop reason: HOSPADM

## 2018-05-23 RX ORDER — HYDROMORPHONE HYDROCHLORIDE 1 MG/ML
.3-.5 INJECTION, SOLUTION INTRAMUSCULAR; INTRAVENOUS; SUBCUTANEOUS EVERY 5 MIN PRN
Status: DISCONTINUED | OUTPATIENT
Start: 2018-05-23 | End: 2018-05-23 | Stop reason: HOSPADM

## 2018-05-23 RX ORDER — NALOXONE HYDROCHLORIDE 0.4 MG/ML
.1-.4 INJECTION, SOLUTION INTRAMUSCULAR; INTRAVENOUS; SUBCUTANEOUS
Status: DISCONTINUED | OUTPATIENT
Start: 2018-05-23 | End: 2018-06-02 | Stop reason: HOSPADM

## 2018-05-23 RX ORDER — CIPROFLOXACIN 2 MG/ML
400 INJECTION, SOLUTION INTRAVENOUS
Status: COMPLETED | OUTPATIENT
Start: 2018-05-23 | End: 2018-05-23

## 2018-05-23 RX ORDER — EPHEDRINE SULFATE 50 MG/ML
INJECTION, SOLUTION INTRAMUSCULAR; INTRAVENOUS; SUBCUTANEOUS PRN
Status: DISCONTINUED | OUTPATIENT
Start: 2018-05-23 | End: 2018-05-23

## 2018-05-23 RX ORDER — AMOXICILLIN 250 MG
2 CAPSULE ORAL 2 TIMES DAILY
Status: DISCONTINUED | OUTPATIENT
Start: 2018-05-23 | End: 2018-06-02 | Stop reason: HOSPADM

## 2018-05-23 RX ORDER — OXYCODONE HYDROCHLORIDE 5 MG/1
5-10 TABLET ORAL EVERY 4 HOURS PRN
Status: DISCONTINUED | OUTPATIENT
Start: 2018-05-23 | End: 2018-06-02 | Stop reason: HOSPADM

## 2018-05-23 RX ORDER — NALOXONE HYDROCHLORIDE 0.4 MG/ML
.1-.4 INJECTION, SOLUTION INTRAMUSCULAR; INTRAVENOUS; SUBCUTANEOUS
Status: DISCONTINUED | OUTPATIENT
Start: 2018-05-23 | End: 2018-05-23

## 2018-05-23 RX ORDER — LIDOCAINE HYDROCHLORIDE 20 MG/ML
INJECTION, SOLUTION INFILTRATION; PERINEURAL PRN
Status: DISCONTINUED | OUTPATIENT
Start: 2018-05-23 | End: 2018-05-23

## 2018-05-23 RX ORDER — ONDANSETRON 4 MG/1
4 TABLET, ORALLY DISINTEGRATING ORAL EVERY 30 MIN PRN
Status: DISCONTINUED | OUTPATIENT
Start: 2018-05-23 | End: 2018-05-23 | Stop reason: HOSPADM

## 2018-05-23 RX ORDER — ONDANSETRON 2 MG/ML
4 INJECTION INTRAMUSCULAR; INTRAVENOUS EVERY 30 MIN PRN
Status: DISCONTINUED | OUTPATIENT
Start: 2018-05-23 | End: 2018-05-23 | Stop reason: HOSPADM

## 2018-05-23 RX ORDER — ONDANSETRON 2 MG/ML
4 INJECTION INTRAMUSCULAR; INTRAVENOUS EVERY 6 HOURS PRN
Status: DISCONTINUED | OUTPATIENT
Start: 2018-05-23 | End: 2018-06-02 | Stop reason: HOSPADM

## 2018-05-23 RX ORDER — FENTANYL CITRATE 50 UG/ML
25-50 INJECTION, SOLUTION INTRAMUSCULAR; INTRAVENOUS EVERY 5 MIN PRN
Status: DISCONTINUED | OUTPATIENT
Start: 2018-05-23 | End: 2018-05-23 | Stop reason: HOSPADM

## 2018-05-23 RX ADMIN — Medication 5 MG: at 12:45

## 2018-05-23 RX ADMIN — LIDOCAINE HYDROCHLORIDE 1 ML: 10 INJECTION, SOLUTION EPIDURAL; INFILTRATION; INTRACAUDAL; PERINEURAL at 09:29

## 2018-05-23 RX ADMIN — NEOSTIGMINE METHYLSULFATE 4 MG: 1 INJECTION, SOLUTION INTRAVENOUS at 13:23

## 2018-05-23 RX ADMIN — SODIUM CHLORIDE, POTASSIUM CHLORIDE, SODIUM LACTATE AND CALCIUM CHLORIDE: 600; 310; 30; 20 INJECTION, SOLUTION INTRAVENOUS at 14:32

## 2018-05-23 RX ADMIN — CIPROFLOXACIN 400 MG: 2 INJECTION INTRAVENOUS at 11:53

## 2018-05-23 RX ADMIN — METRONIDAZOLE 500 MG: 500 INJECTION, SOLUTION INTRAVENOUS at 11:53

## 2018-05-23 RX ADMIN — LIDOCAINE HYDROCHLORIDE 60 MG: 20 INJECTION, SOLUTION INFILTRATION; PERINEURAL at 11:45

## 2018-05-23 RX ADMIN — Medication 1 ML: at 22:13

## 2018-05-23 RX ADMIN — PROPOFOL 120 MG: 10 INJECTION, EMULSION INTRAVENOUS at 11:45

## 2018-05-23 RX ADMIN — Medication 10 MG: at 12:26

## 2018-05-23 RX ADMIN — HYDROMORPHONE HYDROCHLORIDE: 10 INJECTION, SOLUTION INTRAMUSCULAR; INTRAVENOUS; SUBCUTANEOUS at 13:59

## 2018-05-23 RX ADMIN — LIDOCAINE HYDROCHLORIDE 40 MG: 20 INJECTION, SOLUTION INFILTRATION; PERINEURAL at 13:23

## 2018-05-23 RX ADMIN — ROCURONIUM BROMIDE 5 MG: 10 INJECTION INTRAVENOUS at 12:50

## 2018-05-23 RX ADMIN — PHENYLEPHRINE HYDROCHLORIDE 100 MCG: 10 INJECTION, SOLUTION INTRAMUSCULAR; INTRAVENOUS; SUBCUTANEOUS at 12:45

## 2018-05-23 RX ADMIN — Medication 1 ML: at 17:50

## 2018-05-23 RX ADMIN — SODIUM CHLORIDE, POTASSIUM CHLORIDE, SODIUM LACTATE AND CALCIUM CHLORIDE: 600; 310; 30; 20 INJECTION, SOLUTION INTRAVENOUS at 09:29

## 2018-05-23 RX ADMIN — ROCURONIUM BROMIDE 40 MG: 10 INJECTION INTRAVENOUS at 11:45

## 2018-05-23 RX ADMIN — ACETAMINOPHEN 975 MG: 325 TABLET ORAL at 22:06

## 2018-05-23 RX ADMIN — Medication 1 LOZENGE: at 16:28

## 2018-05-23 RX ADMIN — FENTANYL CITRATE 50 MCG: 50 INJECTION, SOLUTION INTRAMUSCULAR; INTRAVENOUS at 12:14

## 2018-05-23 RX ADMIN — ONDANSETRON 4 MG: 2 INJECTION INTRAMUSCULAR; INTRAVENOUS at 13:23

## 2018-05-23 RX ADMIN — GLYCOPYRROLATE 0.6 MG: 0.2 INJECTION, SOLUTION INTRAMUSCULAR; INTRAVENOUS at 13:23

## 2018-05-23 RX ADMIN — ACETAMINOPHEN 975 MG: 325 TABLET ORAL at 09:28

## 2018-05-23 RX ADMIN — DEXAMETHASONE SODIUM PHOSPHATE 4 MG: 4 INJECTION, SOLUTION INTRA-ARTICULAR; INTRALESIONAL; INTRAMUSCULAR; INTRAVENOUS; SOFT TISSUE at 11:54

## 2018-05-23 RX ADMIN — FENTANYL CITRATE 50 MCG: 50 INJECTION, SOLUTION INTRAMUSCULAR; INTRAVENOUS at 11:45

## 2018-05-23 ASSESSMENT — ACTIVITIES OF DAILY LIVING (ADL): COGNITION: 0 - NO COGNITION ISSUES REPORTED

## 2018-05-23 ASSESSMENT — LIFESTYLE VARIABLES: TOBACCO_USE: 1

## 2018-05-23 ASSESSMENT — COPD QUESTIONNAIRES
COPD: 1
CAT_SEVERITY: MILD

## 2018-05-23 NOTE — ANESTHESIA PREPROCEDURE EVALUATION
Anesthesia Evaluation     .             ROS/MED HX    ENT/Pulmonary:     (+)tobacco use, Past use mild COPD, , . .   (-) sleep apnea   Neurologic:       Cardiovascular:     (+) hypertension----. : . . . :. .       METS/Exercise Tolerance:     Hematologic:         Musculoskeletal:         GI/Hepatic:        (-) GERD   Renal/Genitourinary:     (+) BPH,       Endo:         Psychiatric:         Infectious Disease:         Malignancy:   (+) Malignancy History of GI          Other:                     Physical Exam  Normal systems: cardiovascular, pulmonary and dental    Airway   Mallampati: II  TM distance: >3 FB  Neck ROM: full    Dental     Cardiovascular   Rhythm and rate: regular      Pulmonary    breath sounds clear to auscultation                    Anesthesia Plan      History & Physical Review  History and physical reviewed and following examination; no interval change.    ASA Status:  3 .    NPO Status:  > 8 hours    Plan for General and ETT with Intravenous induction. Maintenance will be Balanced.    PONV prophylaxis:  Ondansetron (or other 5HT-3) and Dexamethasone or Solumedrol       Postoperative Care  Postoperative pain management:  IV analgesics.      Consents  Anesthetic plan, risks, benefits and alternatives discussed with:  Patient..                          .

## 2018-05-23 NOTE — ANESTHESIA POSTPROCEDURE EVALUATION
Patient: Hussein Hayes    Procedure(s):  ABDOMINAL EXPLORATION.  MESSENTARY BIOSPY.  GASTROJEJUNOSTOMY - Wound Class: II-Clean Contaminated      Diagnosis:DUODENAL MASS  Diagnosis Additional Information: No value filed.    Anesthesia Type:  General, ETT    Note:  Anesthesia Post Evaluation    Patient location during evaluation: PACU  Patient participation: Able to fully participate in evaluation  Level of consciousness: awake  Pain management: adequate  Airway patency: patent  Cardiovascular status: acceptable  Respiratory status: acceptable  Hydration status: acceptable  PONV: none     Anesthetic complications: None          Last vitals:  Vitals:    05/23/18 1420 05/23/18 1430 05/23/18 1440   BP: 116/72 112/69 116/63   Pulse:      Resp: 10 10 14   Temp:      SpO2: 98% 98% 99%         Electronically Signed By: EDEL SINGH MD  May 23, 2018  2:57 PM

## 2018-05-23 NOTE — OP NOTE
SURGEON: Rupal Anguiano MD   FIRST ASSISTANT: Fredrick Mendez PA-C , The physicians assistant was medically necessary for their expertise in hemostasis, suctioning, suturing, and retraction.    PREOPERATIVE DIAGNOSIS: Duodenal adenocarcinoma.   POSTOPERATIVE DIAGNOSIS: Duodenal adenocarcinoma, metastatic.  OPERATIVE PROCEDURE:   1. Abdominal exploration.   2.  Mesenteric nodule biopsy.   3.  Gastrojejunostomy.  4.  Lysis of adhesions.  ANESTHESIA: General.   ESTIMATED BLOOD LOSS: Less than 15 mL.   EVENTS: After induction of general endotracheal anesthesia,Hussein Hayes abdomen was prepped and draped in the usual sterile fashion. A midline incision was made sharply, and electrocautery dissection down to and through the abdominal wall fascia.  Visual and tactile examination of the liver and abdominal wall peritoneal surfaces revealed no evidence of metastatic disease.  Some omental adhesions to the right lower quadrant were taken down with electrocautery.  We extended our incision retracted the transverse colon cephalad, the ligament of Treitz was identified.  We could see the tumor is growing retroperitoneally and extending along the origin of the small bowel mesentery.  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  At this point seeing no benefit of additional resection, and isoperistaltic gastrojejunostomy was created with a combination of AYLEEN and TA staplers.  3-0 Vicryl sutures used for reinforcement.  NG tube was verified to be in adequate position.  The abdomen was irrigated and aspirated dry and free of debris.  The abdominal wall fascia was closed with multiple interrupted 0 Vicryl sutures. Skin was approximated with 4-0 Vicryl. Steri-Strips and sterile dressings were applied. No immediate complications. All counts correct.     RUPAL ANGUIANO MD

## 2018-05-23 NOTE — IP AVS SNAPSHOT
[place] [address] [facility]    [address] [place] [address]    Phone:  [phone]                                       After Visit Summary   5/23/2018    Hussein Hayes    MRN: [id number]           After Visit Summary Signature Page     I have received my discharge instructions, and my questions have been answered. I have discussed any challenges I see with this plan with the nurse or doctor.    ..........................................................................................................................................  Patient/Patient Representative Signature      ..........................................................................................................................................  Patient Representative Print Name and Relationship to Patient    ..................................................               ................................................  Date                                            Time    ..........................................................................................................................................  Reviewed by Signature/Title    ...................................................              ..............................................  Date                                                            Time

## 2018-05-23 NOTE — IP AVS SNAPSHOT
MRN:[id number]                      After Visit Summary   5/23/2018    Hussein Hayes    MRN: [id number]           Thank you!     Thank you for choosing [facility] for your care. Our goal is always to provide you with excellent care. Hearing back from our patients is one way we can continue to improve our services. Please take a few minutes to complete the written survey that you may receive in the mail after you visit with us. Thank you!        Patient Information     Date Of Birth          1935        Designated Caregiver       Most Recent Value    Caregiver    Will someone help with your care after discharge? yes    Name of designated caregiver Pat  spouse    Phone number of caregiver cell  [phone]   Saman  daughter  cell   [phone]    Caregiver address [address] [address]      About your hospital stay     You were admitted on:  May 23, 2018 You last received care in the:  [place] [address] [facility]    You were discharged on:  June 2, 2018        Reason for your hospital stay       Recovery following abdominal surgery                  Who to Call     For medical emergencies, please call 911.  For non-urgent questions about your medical care, please call your primary care provider or clinic, [phone]  For questions related to your surgery, please call your surgery clinic        Attending Provider     Provider Specialty    Kenneth Berg MD Surgery       Primary Care Provider Office Phone # Fax #    Kenneth G Pallas, [phone]      After Care Instructions     Activity       Avoid heavy lifting (over 20 lbs) and strenuous physical activity for 3 weeks.  Walking is encouraged.            Diet       Continue full liquid diet with protein supplements between meals for a few days. Then begin to introduce regular food.            Discharge Instructions       Take Zantac (Ranitidine) twice daily.  Take tylenol for pain. It is ok to  shower, but avoid submersing the incision for 2 weeks from surgery.                  Follow-up Appointments     Follow-up and recommended labs and tests        Follow up with Dr. Siddiqi at [facility] in [place] in 2-3 weeks. You will have labs on that day and discuss options for treatment at that time. The clinic will call you with appointment date and times.            Follow-up and recommended labs and tests        Call [phone] for follow up appointment in a week with Dr. Hernandez at [facility] for voiding trial.    Follow up with Dr. [facility] at [facility] in about 2 weeks.  Call [phone] to schedule an appointment.                  Further instructions from your care team       [facility]  Discharge Instructions: Post-Operative Bowel Surgery    ACTIVITY    Expect to feel tired after your surgery.  This will gradually resolve.      Take frequent, short walks and increase your activity gradually.      Avoid strenuous physical activity or heavy lifting greater than 15 lbs. for 4 weeks.  You may climb stairs.      You may drive without restrictions when you are not using any prescription pain medication and feel comfortable in a car.    You may return to work/school when you are comfortable without any prescription pain medication.    You may wear an abdominal binder for comfort for 2-3 weeks from your surgery.  You can wash the abdominal binder and dry it on low heat in the dryer.    WOUND CARE    You may remove your outer dressing or Band-Aids and shower 48 hours after the surgery.  Pat your incisions dry and leave them open to air.  Re-apply dressing (Band-Aids or gauze/tape) as needed for comfort or drainage.    You may have steri-strips (looks like white tape) or staples at your incisions.  You may peel off the steri-strips 2 weeks after your surgery.  If you have staples, they will be removed at your next office visit.    Do not  soak your incisions in a tub or pool for 2 weeks.     Do not apply any lotions, creams, or ointments to your incisions.    A ridge under your incisions is normal and will gradually resolve.    DIET    Stay on a low fiber diet until your follow up appointment.  Avoid heavy, spicy, greasy meals and gas forming foods, such as cabbage, broccoli, and onions.      You may find your appetite to be diminished briefly after surgery.  You may take nutritional supplement shakes if you are able.     Drink plenty of fluids to stay hydrated.    PAIN    Expect some tenderness and discomfort at the incision site(s).  Use the prescribed pain medication at your discretion.  Expect gradual resolution of your pain over several days.    You may take ibuprofen with food (unless you have been told not to) instead of or in addition to your prescribed pain medication.  If you are taking Norco or Percocet, do not take any additional acetaminophen/APAP/Tylenol.    Do not drink alcohol or drive while you are taking pain medications.    You may apply ice to your incisions in 20 minute intervals as needed for the next 24-48 hours.  After that time, consider switching to heat if you prefer.    EXPECTATIONS    ***Pain medications can cause constipation.  Limit use when possible.  Take over the counter stool softener/stimulant, such as Colace or Senna, 1-2 times a day with plenty of water.  You may take a mild over the counter laxative, such as Miralax or a suppository, as needed.  You may discontinue these medications once you are having regular bowel movements and/or are no longer taking your narcotic pain medication.    For laparoscopic surgery, you may have shoulder or upper back discomfort due to the gas used in surgery.  This is temporary and should resolve in 48-72 hours.  Short, frequent walks may help with this.      RETURN APPOINTMENT    Follow up with your surgeon in ***10-14 days.  Please call our office at [phone] to schedule your  "appointment.  We are located at [address] [place] [address].    CALL OUR OFFICE [phone] IF YOU HAVE:     Chills or fever above 101  F.    Increased redness, warmth, or drainage at your incisions.    Significant bleeding.    Pain not relieved by your pain medication or rest.    Increasing pain after the first 48 hours.    Any other concerns or questions.    Revised May 2018    Pending Results     No orders found from 2018 to 2018.            Statement of Approval     Ordered          18 1159  I have reviewed and agree with all the recommendations and orders detailed in this document.  EFFECTIVE NOW     Approved and electronically signed by:  Sabrina Rosario PA-C             Admission Information     Date & Time Provider Department Dept. Phone    2018 Kenneth Berg MD [place] [address] [facility] [phone]      Your Vitals Were     Blood Pressure Pulse Temperature Respirations Height Weight    104/66 (BP Location: Left arm) 88 97.8  F (36.6  C) (Oral) 18 1.702 m (5' 7\") 55 kg (121 lb 4.1 oz)    Pulse Oximetry BMI (Body Mass Index)                96% 18.99 kg/m2          MyChart Information     [employer] lets you send messages to your doctor, view your test results, renew your prescriptions, schedule appointments and more. To sign up, go to [email or web address] . Click on \"Log in\" on the left side of the screen, which will take you to the Welcome page. Then click on \"Sign up Now\" on the right side of the page.     You will be asked to enter the access code listed below, as well as some personal information. Please follow the directions to create your username and password.     Your access code is: [email or web address]  Expires: 2018 11:36 AM     Your access code will  in 90 days. If you need help or a new code, please call your [facility] clinic or [phone].        Care EveryWhere ID     This is your Care EveryWhere ID. This could " be used by other organizations to access your [facility] medical records  [id number]        Equal Access to Services     ELOISA MAYER : Hadii aad ku hadlilianatiti Sojanett, waaxda luqadaha, qaybta kaalmada jesse, clara quentin kenziecriselda nguyenumu hopkins meli khan. So [facility] [phone].    ATENCIÓN: Si habla español, tiene a coffey disposición servicios gratuitos de asistencia lingüística. Llame al [phone].    We comply with applicable federal civil rights laws and [place] laws. We do not discriminate on the basis of race, color, national origin, age, disability, sex, sexual orientation, or gender identity.               Review of your medicines      START taking        Dose / Directions    tamsulosin 0.4 MG capsule   Commonly known as:  FLOMAX   Used for:  Urinary retention        Dose:  0.4 mg   Take 1 capsule (0.4 mg) by mouth daily   Quantity:  60 capsule   Refills:  0         CONTINUE these medicines which have NOT CHANGED        Dose / Directions    lisinopril-hydrochlorothiazide 20-25 MG per tablet   Commonly known as:  PRINZIDE/ZESTORETIC        Dose:  1 tablet   Take 1 tablet by mouth daily   Refills:  0       umeclidinium-vilanterol 62.5-25 MCG/INH oral inhaler   Commonly known as:  ANORO ELLIPTA        Dose:  1 puff   Inhale 1 puff into the lungs daily as needed   Refills:  0            Where to get your medicines      These medications were sent to [facility] - [address]  [address] [place] [address] [address]     Phone:  [phone]     tamsulosin 0.4 MG capsule                Protect others around you: Learn how to safely use, store and throw away your medicines at www.disposemymeds.org.             Medication List: This is a list of all your medications and when to take them. Check marks below indicate your daily home schedule. Keep this list as a reference.      Medications           Morning Afternoon Evening Bedtime As Needed    lisinopril-hydrochlorothiazide 20-25  MG per tablet   Commonly known as:  PRINZIDE/ZESTORETIC   Take 1 tablet by mouth daily   Last time this was given:  1 tablet on 5/26/2018  9:15 AM                                tamsulosin 0.4 MG capsule   Commonly known as:  FLOMAX   Take 1 capsule (0.4 mg) by mouth daily   Last time this was given:  0.4 mg on 6/2/2018  9:10 AM                                umeclidinium-vilanterol 62.5-25 MCG/INH oral inhaler   Commonly known as:  ANORO ELLIPTA   Inhale 1 puff into the lungs daily as needed   Last time this was given:  1 puff on 5/31/2018  7:36 PM

## 2018-05-23 NOTE — PROGRESS NOTES
Admission medication history interview status for the 5/23/2018  admission is complete. See EPIC admission navigator for prior to admission medications     Medication history source reliability:Good    Medication history interview source(s):Patient    Medication history resources (including written lists, pill bottles, clinic record):None    Primary pharmacy.[facility]    Additional medication history information not noted on PTA med list :None    Time spent in this activity: 45 minutes    Prior to Admission medications    Medication Sig Last Dose Taking? Auth Provider   lisinopril-hydrochlorothiazide (PRINZIDE/ZESTORETIC) 20-25 MG per tablet Take 1 tablet by mouth daily 5/23/2018 at 0600 Yes Reported, Patient   umeclidinium-vilanterol (ANORO ELLIPTA) 62.5-25 MCG/INH oral inhaler Inhale 1 puff into the lungs daily as needed  5/21/2018 at prn Yes Reported, Patient

## 2018-05-23 NOTE — ANESTHESIA CARE TRANSFER NOTE
Patient: Hussein Hayes    Procedure(s):  ABDOMINAL EXPLORATION.  MESSENTARY BIOSPY.  GASTROJEJUNOSTOMY - Wound Class: II-Clean Contaminated      Diagnosis: DUODENAL MASS  Diagnosis Additional Information: No value filed.    Anesthesia Type:   General, ETT     Note:  Airway :Face Mask  Patient transferred to:PACU  Comments: Neuromuscular blockade reversed after TOF 4/4, spontaneous respirations, adequate tidal volumes, followed commands to voice, oropharynx suctioned with soft flexible catheter, extubated atraumatically, extubated with suction, airway patent after extubation.  Oxygen via facemask at 6 liters per minute to PACU. Oxygen tubing connected to wall O2 in PACU, SpO2, NiBP, and EKG monitors and alarms on and functioning, Liliana Hugger warmer connected to patient gown, report on patient's clinical status given to PACU RN, RN questions answered. Handoff Report: Identifed the Patient, Identified the Reponsible Provider, Reviewed the pertinent medical history, Discussed the surgical course, Reviewed Intra-OP anesthesia mangement and issues during anesthesia, Set expectations for post-procedure period and Allowed opportunity for questions and acknowledgement of understanding      Vitals: (Last set prior to Anesthesia Care Transfer)    CRNA VITALS  5/23/2018 1259 - 5/23/2018 1332      5/23/2018             Pulse: 113    SpO2: 99 %    Resp Rate (observed): (!)  3    Resp Rate (set): 10                Electronically Signed By: KALEB Patel CRNA  May 23, 2018  1:32 PM

## 2018-05-23 NOTE — PLAN OF CARE
Problem: Patient Care Overview  Goal: Plan of Care/Patient Progress Review  Outcome: No Change  VSS, bowels hypoactive, -flatus, scant dried drainage on dressing unchanged, NG LIS at 68cm, adequate urine in cosme, pca for pain

## 2018-05-24 LAB
ANION GAP SERPL CALCULATED.3IONS-SCNC: 12 MMOL/L (ref 3–14)
BASOPHILS # BLD AUTO: 0 10E9/L (ref 0–0.2)
BASOPHILS NFR BLD AUTO: 0.1 %
BUN SERPL-MCNC: 20 MG/DL (ref 7–30)
CALCIUM SERPL-MCNC: 8.5 MG/DL (ref 8.5–10.1)
CHLORIDE SERPL-SCNC: 97 MMOL/L (ref 94–109)
CO2 SERPL-SCNC: 21 MMOL/L (ref 20–32)
CREAT SERPL-MCNC: 1.19 MG/DL (ref 0.66–1.25)
DIFFERENTIAL METHOD BLD: ABNORMAL
EOSINOPHIL # BLD AUTO: 0 10E9/L (ref 0–0.7)
EOSINOPHIL NFR BLD AUTO: 0 %
ERYTHROCYTE [DISTWIDTH] IN BLOOD BY AUTOMATED COUNT: 12.5 % (ref 10–15)
GFR SERPL CREATININE-BSD FRML MDRD: 59 ML/MIN/1.7M2
GLUCOSE SERPL-MCNC: 110 MG/DL (ref 70–99)
HCT VFR BLD AUTO: 36.5 % (ref 40–53)
HGB BLD-MCNC: 12.7 G/DL (ref 13.3–17.7)
IMM GRANULOCYTES # BLD: 0.1 10E9/L (ref 0–0.4)
IMM GRANULOCYTES NFR BLD: 0.5 %
LYMPHOCYTES # BLD AUTO: 0.7 10E9/L (ref 0.8–5.3)
LYMPHOCYTES NFR BLD AUTO: 4.6 %
MCH RBC QN AUTO: 30.3 PG (ref 26.5–33)
MCHC RBC AUTO-ENTMCNC: 34.8 G/DL (ref 31.5–36.5)
MCV RBC AUTO: 87 FL (ref 78–100)
MONOCYTES # BLD AUTO: 0.9 10E9/L (ref 0–1.3)
MONOCYTES NFR BLD AUTO: 5.8 %
NEUTROPHILS # BLD AUTO: 13.9 10E9/L (ref 1.6–8.3)
NEUTROPHILS NFR BLD AUTO: 89 %
NRBC # BLD AUTO: 0 10*3/UL
NRBC BLD AUTO-RTO: 0 /100
PLATELET # BLD AUTO: 240 10E9/L (ref 150–450)
POTASSIUM SERPL-SCNC: 4.1 MMOL/L (ref 3.4–5.3)
RBC # BLD AUTO: 4.19 10E12/L (ref 4.4–5.9)
SODIUM SERPL-SCNC: 130 MMOL/L (ref 133–144)
WBC # BLD AUTO: 15.5 10E9/L (ref 4–11)

## 2018-05-24 PROCEDURE — 80048 BASIC METABOLIC PNL TOTAL CA: CPT | Performed by: PHYSICIAN ASSISTANT

## 2018-05-24 PROCEDURE — 36415 COLL VENOUS BLD VENIPUNCTURE: CPT | Performed by: PHYSICIAN ASSISTANT

## 2018-05-24 PROCEDURE — 25000128 H RX IP 250 OP 636: Performed by: PHYSICIAN ASSISTANT

## 2018-05-24 PROCEDURE — A9270 NON-COVERED ITEM OR SERVICE: HCPCS | Mod: GY | Performed by: PHYSICIAN ASSISTANT

## 2018-05-24 PROCEDURE — 12000007 ZZH R&B INTERMEDIATE

## 2018-05-24 PROCEDURE — 85025 COMPLETE CBC W/AUTO DIFF WBC: CPT | Performed by: PHYSICIAN ASSISTANT

## 2018-05-24 PROCEDURE — 25000132 ZZH RX MED GY IP 250 OP 250 PS 637: Mod: GY | Performed by: PHYSICIAN ASSISTANT

## 2018-05-24 RX ORDER — SODIUM CHLORIDE AND POTASSIUM CHLORIDE 150; 900 MG/100ML; MG/100ML
INJECTION, SOLUTION INTRAVENOUS CONTINUOUS
Status: ACTIVE | OUTPATIENT
Start: 2018-05-24 | End: 2018-05-28

## 2018-05-24 RX ADMIN — ACETAMINOPHEN 975 MG: 325 TABLET ORAL at 06:38

## 2018-05-24 RX ADMIN — ACETAMINOPHEN 975 MG: 325 TABLET ORAL at 14:24

## 2018-05-24 RX ADMIN — SENNOSIDES AND DOCUSATE SODIUM 2 TABLET: 8.6; 5 TABLET ORAL at 21:06

## 2018-05-24 RX ADMIN — SENNOSIDES AND DOCUSATE SODIUM 1 TABLET: 8.6; 5 TABLET ORAL at 08:41

## 2018-05-24 RX ADMIN — POTASSIUM CHLORIDE AND SODIUM CHLORIDE: 900; 150 INJECTION, SOLUTION INTRAVENOUS at 19:32

## 2018-05-24 RX ADMIN — LISINOPRIL AND HYDROCHLOROTHIAZIDE 1 TABLET: 25; 20 TABLET ORAL at 08:40

## 2018-05-24 RX ADMIN — HYDROMORPHONE HYDROCHLORIDE: 10 INJECTION, SOLUTION INTRAMUSCULAR; INTRAVENOUS; SUBCUTANEOUS at 22:06

## 2018-05-24 RX ADMIN — SODIUM CHLORIDE, POTASSIUM CHLORIDE, SODIUM LACTATE AND CALCIUM CHLORIDE: 600; 310; 30; 20 INJECTION, SOLUTION INTRAVENOUS at 00:10

## 2018-05-24 RX ADMIN — SODIUM CHLORIDE, POTASSIUM CHLORIDE, SODIUM LACTATE AND CALCIUM CHLORIDE: 600; 310; 30; 20 INJECTION, SOLUTION INTRAVENOUS at 10:02

## 2018-05-24 RX ADMIN — ENOXAPARIN SODIUM 40 MG: 40 INJECTION SUBCUTANEOUS at 08:52

## 2018-05-24 RX ADMIN — POTASSIUM CHLORIDE AND SODIUM CHLORIDE: 900; 150 INJECTION, SOLUTION INTRAVENOUS at 11:26

## 2018-05-24 NOTE — PLAN OF CARE
Problem: Patient Care Overview  Goal: Plan of Care/Patient Progress Review  AVSS. Pain controlled with PCA. Midline incision; drainage marked. LS dimin throughout. NPO; NG to Low intermittent suction; brown/blackoutput. Araya with Adequate urine output. Up with Assist of 1. BS Hypo; mild distention; abd binder in place.

## 2018-05-24 NOTE — CONSULTS
"CLINICAL NUTRITION SERVICES  -  ASSESSMENT NOTE      Future/Additional Recommendations:   Medical Food Supplement - will start once NG removed     Malnutrition:   % Weight Loss:  Up to 7.5% in 3 months (non-severe malnutrition)  % Intake:  <75% for >/= 3 months (non-severe malnutrition)  Subcutaneous Fat Loss:  None observed  Muscle Loss:  None observed  Fluid Retention:  None noted    Malnutrition Diagnosis: Non-Severe malnutrition  In Context of:  Acute illness or injury          REASON FOR ASSESSMENT  Hussein Hayes is a 82 year old male seen by Registered Dietitian for Admission Nutrition Risk Screen - Unintentional weight loss of 10# or more in past 2 months      NUTRITION HISTORY  - Information obtained from the pt's wife and daughter.  He has had decreased in take over the past 3 months (since February) due to GI issues.  He would have N/V after eating solids, eventually was only able to take liquids and has had minimal intake for the past 10 days.  Pt was drinking Premier Protein drinks, 3/day, which are very high in protein but low in calories.      CURRENT NUTRITION ORDERS  Diet Order:     Starting clear liquids. NG to intermittent suction.    Current Intake/Tolerance:  Pt has had a popsicle so far this morning.      PHYSICAL FINDINGS  Observed  No nutrition-related physical findings observed  Obtained from Chart/Interdisciplinary Team  None noted    ANTHROPOMETRICS  Height: 5' 7\"  Weight: 119 lbs 11.36 oz (54.3 kg)  Body mass index is 18.75 kg/(m^2).   Weight Status:  Normal BMI - but at the very low end  IBW: 67.3 kg +/- 10%  % IBW: 81%  Weight History: Surprisingly he's only lost 5-6# (5%) over the past 3 months. He states his usual wt is 125#. He's always been thin, says his top wt was 150# years ago.  Wt Readings from Last 10 Encounters:   05/24/18 54.3 kg (119 lb 11.4 oz)   05/09/18 53.5 kg (118 lb)   05/04/18 53.5 kg (118 lb)        LABS  Labs reviewed    MEDICATIONS  Medications " reviewed      ASSESSED NUTRITION NEEDS PER APPROVED PRACTICE GUIDELINES:  Dosing Weight 54.3 kg - current wt  Estimated Energy Needs: 1901-9598 kcals (30-35 Kcal/Kg)  Justification: underweight  Estimated Protein Needs:  grams protein (1.5-2 g pro/Kg)  Justification: Repletion      MALNUTRITION:  % Weight Loss:  Up to 7.5% in 3 months (non-severe malnutrition)  % Intake:  <75% for >/= 3 months (non-severe malnutrition)  Subcutaneous Fat Loss:  None observed  Muscle Loss:  None observed  Fluid Retention:  None noted    Malnutrition Diagnosis: Non-Severe malnutrition  In Context of:  Acute illness or injury    NUTRITION DIAGNOSIS:  Inadequate protein-energy intake related to altered GI function as evidenced by report of minimal oral intake and 5% wt loss x 3 months      NUTRITION INTERVENTIONS  Recommendations / Nutrition Prescription  ADAT per MD discretion      Implementation  Nutrition education: Per Provider order if indicated   Medical Food Supplement - will start once NG removed      Nutrition Goals  Pt will advance to solids in 2-3 days.      MONITORING AND EVALUATION:  Progress towards goals will be monitored and evaluated per protocol and Practice Guidelines      Keli Guerra RD  Pager [phone] (M-F)            [phone] ([facility] Hol)

## 2018-05-24 NOTE — CONSULTS
Consult Date:  05/24/2018      REQUESTING PHYSICIAN:  Kenneth Berg MD.      PRIMARY ONCOLOGIST:  Mary Siddiqi MD.      REASON FOR CONSULTATION:  Metastatic duodenal cancer.      HISTORY.  Medical record reviewed, history obtained, patient examined.      Mr. Hayes is an 82-year-old man presented with nausea, vomiting, early satiety and minimal weight loss for 6 weeks.  CT scan of abdomen and pelvis showed irregular mass in the mid retroperitoneum and mesenteric root arising in the third portion of the duodenum, resulting moderate proximal dilatation of the duodenum.  Upper GI endoscopy showed malignant duodenal mass, which was obstructive, unable to pass the scope through it and the biopsy was invasive moderately differentiated adenocarcinoma.  CT scan of the chest on 05/08 showed no metastatic disease.  He is hospitalized currently and underwent abdominal exploration on 05/23.  The operative report indicates that the tumor was growing retroperitoneally and extending along the origin of the small bowel mesentery.  The disease noted going distal toward the small bowel mesentery.  One of these nodules were sent for frozen section, which confirmed to be adenocarcinoma.  Determined that there is no benefit of additional resection and he underwent isoperistaltic gastrojejunostomy.  He is currently on station 33 feeling relatively well with some pain in the epigastrium.  His overall health prior to this episode was excellent performance status, ECOG 1.      PAST MEDICAL HISTORY:  Hypertension, appendectomy, tonsillectomy, cataract surgery.      MEDICATIONS:  Currently:   1.  Lovenox.   2.  Tylenol.   3.  Prinzide.   4.  Senna.   5.  Dilaudid.      SOCIAL HISTORY:  He is , lives in a townhouse.  Smoked a pack a day, occasionally 2 packs a day until 2006 since he was 16 and drinks 2 alcoholic beverages daily.  He used to drink a 5-6 beverages in the 60s for 10 years.  He is retired from work in manufacturing.       FAMILY HISTORY:  Negative for malignancy or blood disorders.      ALLERGIES:  PENICILLIN.      REVIEW OF SYSTEMS:     Significant for early satiety, nausea, vomiting, abdominal pain, diarrhea the last couple of days. No neurological symptoms.  No urinary symptoms.  No shortness of breath or cough.  The remainder of 10-point system review is unremarkable.      PHYSICAL EXAMINATION:   GENERAL:  Pleasant, in no acute distress.   VITAL SIGNS:  Stable as charted, afebrile.  Oxygen 97%.  His weight is 119.   HEENT:  He has an NG tube in.  Sclerae anicteric.  Face no facial asymmetry.   NECK:  No lymphadenopathy.   LUNGS:  Clear to auscultation, no dullness.   HEART:  Regular rhythm.   ABDOMEN:  Status post surgery and has a brace.  Deep palpation avoided.  Bowel sounds are diminished.   EXTREMITIES:  No cyanosis or edema.   LYMPHATIC:  No lymphadenopathy in cervical, supraclavicular, axillary, epitrochlear or inguinal areas.   NEUROLOGIC:  Grossly intact.   SKIN:  Limited skin examination, no petechia or ecchymosis.      ANCILLARY DATA:  Creatinine 1.19, calcium 8.5, electrolytes normal.  White count 15, hemoglobin 12.7, platelets 240.  Differential is left shift.  Pathology from yesterday, small bowel mesenteric nodule deposit, excision showing moderately differentiated adenocarcinoma with mucinous differentiation.  CT scans as detailed.      IMPRESSION:   1.  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   2.  Excellent ECOG performance status prior to the illness with no significant morbidities.   3.  GI obstruction of the duodenal tumor site, status post gastrojejunostomy.      DISCUSSION AND RECOMMENDATIONS:  I explained to Mr. Hayes that this is unresectable and metastatic disease, which is generally considered incurable.  Treatment with systemic therapy can improve survival and decrease burden of disease and despite his age, he is in excellent health and it  is reasonable to consider systemic therapy.  FOLFOX will be a reasonable option, but he is advised to discuss with Dr. Siddiqi after recovery from the surgery to meet with her in a couple of weeks.  He inquired about oral chemotherapy and I explained that XELOX regimen is an option, but will have infusional part in addition to oral Xeloda, but there is concern about his oral intake and will need time to make sure he is able to swallow, so he is able to pass fluid without difficulty.  Role of immune and targeted therapy pembrolizumab can be used if he progressed on initial upfront chemotherapy after testing for MMR status.  He inquired about prognosis and I explained to him what median survival definition is and indicated that metastatic GI cancer prognosis is associated with about 6 months median survival untreated and may gain about 6 months with systemic therapy, but these numbers do change and with introduction of more systemic therapy agent, the data will change with time.      I appreciate the opportunity to participate in the care of Lina Godinez.  Our team will follow while hospitalized and he is advised to follow in [place] office with Dr. Siddiqi.         ELIAS MANZO MD             D: 2018   T: 2018   MT: JACKY      Name:     LINA GODINEZ   MRN:      [id number]        Account:       [id number]   :      1935           Consult Date:  2018      Document: [id number]       cc: Kenneth Pallas MD

## 2018-05-24 NOTE — PROGRESS NOTES
"Surgery    No complaints this morning.   Report minimal incisional pain.  Tolerating NG ok.   No nausea.   No bowel function.    Gen:  Awake, Alert, NAD, pleasant and conversational  Blood pressure 125/75, pulse 76, temperature 97.8  F (36.6  C), temperature source Oral, resp. rate 16, height 1.702 m (5' 7\"), weight 54.3 kg (119 lb 11.4 oz), SpO2 95 %.  Resp - clear to ascultation.    Cardiac - Regular rate & rhythm without murmur  Abdomen - normal bowel sounds, soft, non tender, flat. Incisions healing appropriately without erythema or purulent drainage.  Extremities - no lower extremity edema.    Na+ 130  Wbc 15.5  Hgb 12.7    + since surgery, dark green/brownish    Final path pending.    A/P Metastatic obstructing duodenal adenocarcinoma, s/p exploratory laparotomy with biopsy of mesenteric nodule and gastrojejunostomy bypass on 5/23/18.    - continue NG tube on intermittent suction for gastric decompression  - clear liquids.  - ambulate.  - change IVF to NS    Fredrick Mendez PA-C  Office: [phone]  Pager: [phone]    "

## 2018-05-24 NOTE — PLAN OF CARE
Problem: Patient Care Overview  Goal: Plan of Care/Patient Progress Review  Outcome: Improving  Pt alert and oriented. Up with SBA. Araya DCed at 1215. Ambulated in wing x2 this shift. NG with dark green/brown output. Started on clears, no clamping trials yet, still low-int suction. 400 out of NG. Using IS with encouragement. Minimal pain, using PCA occasionally. Dressing to abdomen with dried drainage, no change. Abdominal binder in place. Hypo bowel sounds, serge flatus. NS + 20mEq K infusing at 125/hr.

## 2018-05-25 LAB
COPATH REPORT: NORMAL
GLUCOSE BLDC GLUCOMTR-MCNC: 109 MG/DL (ref 70–99)
INTERPRETATION ECG - MUSE: NORMAL
LACTATE BLD-SCNC: 2.3 MMOL/L (ref 0.4–1.9)

## 2018-05-25 PROCEDURE — 12000007 ZZH R&B INTERMEDIATE

## 2018-05-25 PROCEDURE — 25000132 ZZH RX MED GY IP 250 OP 250 PS 637: Mod: GY | Performed by: PHYSICIAN ASSISTANT

## 2018-05-25 PROCEDURE — 36415 COLL VENOUS BLD VENIPUNCTURE: CPT | Performed by: SURGERY

## 2018-05-25 PROCEDURE — 83605 ASSAY OF LACTIC ACID: CPT | Performed by: SURGERY

## 2018-05-25 PROCEDURE — 25000128 H RX IP 250 OP 636: Performed by: PHYSICIAN ASSISTANT

## 2018-05-25 PROCEDURE — 00000146 ZZHCL STATISTIC GLUCOSE BY METER IP

## 2018-05-25 PROCEDURE — A9270 NON-COVERED ITEM OR SERVICE: HCPCS | Mod: GY | Performed by: PHYSICIAN ASSISTANT

## 2018-05-25 PROCEDURE — 25000125 ZZHC RX 250: Performed by: SURGERY

## 2018-05-25 RX ADMIN — POTASSIUM CHLORIDE AND SODIUM CHLORIDE: 900; 150 INJECTION, SOLUTION INTRAVENOUS at 19:22

## 2018-05-25 RX ADMIN — ENOXAPARIN SODIUM 40 MG: 40 INJECTION SUBCUTANEOUS at 09:45

## 2018-05-25 RX ADMIN — POTASSIUM CHLORIDE AND SODIUM CHLORIDE: 900; 150 INJECTION, SOLUTION INTRAVENOUS at 03:39

## 2018-05-25 RX ADMIN — ACETAMINOPHEN 975 MG: 325 TABLET ORAL at 06:35

## 2018-05-25 RX ADMIN — LIDOCAINE HYDROCHLORIDE 20 ML: 20 JELLY TOPICAL at 02:35

## 2018-05-25 RX ADMIN — LIDOCAINE HYDROCHLORIDE 10 ML: 20 JELLY TOPICAL at 19:30

## 2018-05-25 RX ADMIN — SENNOSIDES AND DOCUSATE SODIUM 1 TABLET: 8.6; 5 TABLET ORAL at 09:45

## 2018-05-25 RX ADMIN — LISINOPRIL AND HYDROCHLOROTHIAZIDE 1 TABLET: 25; 20 TABLET ORAL at 09:45

## 2018-05-25 NOTE — DISCHARGE INSTRUCTIONS
[facility] - [facility]  Discharge Instructions: Post-Operative Bowel Surgery    ACTIVITY    Expect to feel tired after your surgery.  This will gradually resolve.      Take frequent, short walks and increase your activity gradually.      Avoid strenuous physical activity or heavy lifting greater than 15 lbs. for 4 weeks.  You may climb stairs.      You may drive without restrictions when you are not using any prescription pain medication and feel comfortable in a car.    You may return to work/school when you are comfortable without any prescription pain medication.    You may wear an abdominal binder for comfort for 2-3 weeks from your surgery.  You can wash the abdominal binder and dry it on low heat in the dryer.    WOUND CARE    You may remove your outer dressing or Band-Aids and shower 48 hours after the surgery.  Pat your incisions dry and leave them open to air.  Re-apply dressing (Band-Aids or gauze/tape) as needed for comfort or drainage.    You may have steri-strips (looks like white tape) or staples at your incisions.  You may peel off the steri-strips 2 weeks after your surgery.  If you have staples, they will be removed at your next office visit.    Do not soak your incisions in a tub or pool for 2 weeks.     Do not apply any lotions, creams, or ointments to your incisions.    A ridge under your incisions is normal and will gradually resolve.    DIET    Stay on a low fiber diet until your follow up appointment.  Avoid heavy, spicy, greasy meals and gas forming foods, such as cabbage, broccoli, and onions.      You may find your appetite to be diminished briefly after surgery.  You may take nutritional supplement shakes if you are able.     Drink plenty of fluids to stay hydrated.    PAIN    Expect some tenderness and discomfort at the incision site(s).  Use the prescribed pain medication at your discretion.  Expect gradual resolution of your pain over several days.    You may  take ibuprofen with food (unless you have been told not to) instead of or in addition to your prescribed pain medication.  If you are taking Norco or Percocet, do not take any additional acetaminophen/APAP/Tylenol.    Do not drink alcohol or drive while you are taking pain medications.    You may apply ice to your incisions in 20 minute intervals as needed for the next 24-48 hours.  After that time, consider switching to heat if you prefer.    EXPECTATIONS    ***Pain medications can cause constipation.  Limit use when possible.  Take over the counter stool softener/stimulant, such as Colace or Senna, 1-2 times a day with plenty of water.  You may take a mild over the counter laxative, such as Miralax or a suppository, as needed.  You may discontinue these medications once you are having regular bowel movements and/or are no longer taking your narcotic pain medication.    For laparoscopic surgery, you may have shoulder or upper back discomfort due to the gas used in surgery.  This is temporary and should resolve in 48-72 hours.  Short, frequent walks may help with this.      RETURN APPOINTMENT    Follow up with your surgeon in ***10-14 days.  Please call our office at [phone] to schedule your appointment.  We are located at [address] [place] [address].    CALL OUR OFFICE -[phone] IF YOU HAVE:     Chills or fever above 101  F.    Increased redness, warmth, or drainage at your incisions.    Significant bleeding.    Pain not relieved by your pain medication or rest.    Increasing pain after the first 48 hours.    Any other concerns or questions.    Revised May 2018

## 2018-05-25 NOTE — PLAN OF CARE
Problem: Patient Care Overview  Goal: Plan of Care/Patient Progress Review  Outcome: No Change  VSS. A&O. Lung sounds clear and equal. BS active. + flatus. Urine retention overnight, straight cathed for 450, re-scanned at 7 for 0. Pain controlled with sparing use of PCA. Up with SBA. Abdominal incision covered with gauze, with marked SS drainage.

## 2018-05-25 NOTE — PROGRESS NOTES
Surgery    Paged re: hospital implemented lactic acid lab draw.   Result: 2.3 - patient reassessed and he is doing fine.  Abnormal lactic acid attributed to solid tumor.  Recheck cbc tomorrow.   Patient did not tolerate NG clamping so it is back to intermittent wall suction.  Supplemental oxygen prn.    Fredrick Mendez PA-C  Office: [phone]  Pager: [phone]

## 2018-05-25 NOTE — PLAN OF CARE
Problem: Patient Care Overview  Goal: Plan of Care/Patient Progress Review  Outcome: No Change  Patient up with SBA. Walked x1 in hallway. Clear liquid diet. Trial of clamping NG unsuccessful, patient became nauseous, improved after reconnecting to LIS. Voided x1, continuing to monitor output. Patient did not use PCA this shift.

## 2018-05-25 NOTE — PLAN OF CARE
Problem: Patient Care Overview  Goal: Plan of Care/Patient Progress Review  Outcome: Improving  VSS on RA.. A/Ox4. Abdominal incision has scant drainage borders marked. Pain controlled with PCA pump. Up with standby assist in halls. NG tube at 68cm, intermittent suction, with dark green/brown output. Clear liquid diet even though NG is in. BS normoactive in all quads. Flatus not present at this time, encouraged to drink some apple juice to help. Voiding to urinal/toilet. LS clear and diminished in all lobes. IS to to 1000.

## 2018-05-25 NOTE — PROVIDER NOTIFICATION
Paged RAJENDRA Alcala, regarding lactic result. Message given to RN in OR who will notify Fredrick.

## 2018-05-25 NOTE — PROGRESS NOTES
"[facility] Hematology Progress Note     Primary Oncologist/Hematologist:  Dr. Siddiqi          Assessment and Plan:     Karlos is an 82 year old man with nausea, vomiting, early satiety and weight loss. He was admitted for abdominal exploration.    1.  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 We briefly touched on the regimens of FOLFOX and XELOX.  - Karlos should see Dr. Siddiqi in 2-3 weeks after discharge to discuss chemotherapy  - We reviewed that this is treatable, but not curable    Shaun MANUEL, CNP  [facility]  [phone]        Interval History:   Karlos is feeling okay. NG clamped this morning. No pain.              Review of Systems:   The 5 point Review of Systems is negative other than noted in the HPI             Medications:   Scheduled Medications    acetaminophen  975 mg Oral Q8H     enoxaparin  40 mg Subcutaneous Q24H     lisinopril-hydrochlorothiazide  1 tablet Oral Daily     senna-docusate  1 tablet Oral BID    Or     senna-docusate  2 tablet Oral BID     sodium chloride (PF)  3 mL Intracatheter Q8H     PRN Medications  [START ON 5/26/2018] acetaminophen, sore throat lozenge, diphenhydrAMINE **OR** diphenhydrAMINE, lidocaine 4%, lidocaine (buffered or not buffered), naloxone, ondansetron **OR** ondansetron, oxyCODONE IR, phenol, prochlorperazine **OR** prochlorperazine, sodium chloride (PF), umeclidinium-vilanterol               Physical Exam:   Vitals were reviewed  Blood pressure 102/74, pulse 82, temperature 97.8  F (36.6  C), temperature source Oral, resp. rate 16, height 1.702 m (5' 7\"), weight 52.1 kg (114 lb 13.8 oz), SpO2 96 %.  Wt Readings from Last 4 Encounters:   05/25/18 52.1 kg (114 lb 13.8 oz)   05/09/18 53.5 kg (118 lb)   05/04/18 " 53.5 kg (118 lb)       I/O last 3 completed shifts:  In: 3215 [P.O.:400; I.V.:2815]  Out: 2600 [Urine:850; Emesis/NG output:1750]      Constitutional: Awake, alert, cooperative, no apparent distress   Lungs: Clear to auscultation bilaterally, no crackles or wheezing   Cardiovascular: Regular rate and rhythm, normal S1 and S2, and no murmur noted   Abdomen: Normal bowel sounds, soft. Nontender. NG tube in place.   Skin: Abdominal binder on. Steri-strips in place. Incision looks well approximated.   Other:               Data:   All laboratory data and imaging studies reviewed.    CMP  Recent Labs  Lab 05/24/18  0715 05/23/18  0859   *  --    POTASSIUM 4.1 3.8   CHLORIDE 97  --    CO2 21  --    ANIONGAP 12  --    *  --    BUN 20  --    CR 1.19 1.29*   GFRESTIMATED 59* 53*   GFRESTBLACK 71 65   LUBNA 8.5  --      CBC  Recent Labs  Lab 05/24/18  0715 05/23/18  0859   WBC 15.5*  --    RBC 4.19*  --    HGB 12.7* 13.2*   HCT 36.5*  --    MCV 87  --    MCH 30.3  --    MCHC 34.8  --    RDW 12.5  --     267     INRNo lab results found in last 7 days.  Data   Results for orders placed or performed during the hospital encounter of 05/23/18 (from the past 24 hour(s))   Glucose by meter   Result Value Ref Range    Glucose 109 (H) 70 - 99 mg/dL   Lactic acid level STAT for sepsis protocol   Result Value Ref Range    Lactate for Sepsis Protocol 2.3 (HH) 0.4 - 1.9 mmol/L

## 2018-05-25 NOTE — PROGRESS NOTES
"Surgery    Patient does not want his wife to know his diagnoses/prognosis yet.    No bowel function  Straight cathed last night.   No pain at rest.   Ng in place    Gen:  Awake, Alert, NAD  Blood pressure 102/74, pulse 82, temperature 97.8  F (36.6  C), temperature source Oral, resp. rate 16, height 1.702 m (5' 7\"), weight 52.1 kg (114 lb 13.8 oz), SpO2 96 %.  Resp - clear to ascultation.    Cardiac - Regular rate & rhythm without murmur  Abdomen - soft, appropriately tender, mildly distended. Incisions healing appropriately without erythema or purulent drainage.  Extremities - no lower extremity edema.    A/P   - clamp NG trial  - place cosme if persistent UR  - walk CADE Mendez PA-C  Office: [phone]  Pager: [phone]  "

## 2018-05-26 LAB
BASOPHILS # BLD AUTO: 0 10E9/L (ref 0–0.2)
BASOPHILS NFR BLD AUTO: 0.2 %
CREAT SERPL-MCNC: 1.14 MG/DL (ref 0.66–1.25)
DIFFERENTIAL METHOD BLD: ABNORMAL
EOSINOPHIL # BLD AUTO: 0.1 10E9/L (ref 0–0.7)
EOSINOPHIL NFR BLD AUTO: 0.6 %
ERYTHROCYTE [DISTWIDTH] IN BLOOD BY AUTOMATED COUNT: 13.1 % (ref 10–15)
GFR SERPL CREATININE-BSD FRML MDRD: 62 ML/MIN/1.7M2
GLUCOSE BLDC GLUCOMTR-MCNC: 102 MG/DL (ref 70–99)
GLUCOSE BLDC GLUCOMTR-MCNC: 120 MG/DL (ref 70–99)
HCT VFR BLD AUTO: 34.6 % (ref 40–53)
HGB BLD-MCNC: 11.5 G/DL (ref 13.3–17.7)
IMM GRANULOCYTES # BLD: 0 10E9/L (ref 0–0.4)
IMM GRANULOCYTES NFR BLD: 0.4 %
LYMPHOCYTES # BLD AUTO: 1.1 10E9/L (ref 0.8–5.3)
LYMPHOCYTES NFR BLD AUTO: 9.7 %
MCH RBC QN AUTO: 29.6 PG (ref 26.5–33)
MCHC RBC AUTO-ENTMCNC: 33.2 G/DL (ref 31.5–36.5)
MCV RBC AUTO: 89 FL (ref 78–100)
MONOCYTES # BLD AUTO: 0.7 10E9/L (ref 0–1.3)
MONOCYTES NFR BLD AUTO: 6.2 %
NEUTROPHILS # BLD AUTO: 9.2 10E9/L (ref 1.6–8.3)
NEUTROPHILS NFR BLD AUTO: 82.9 %
NRBC # BLD AUTO: 0 10*3/UL
NRBC BLD AUTO-RTO: 0 /100
PLATELET # BLD AUTO: 238 10E9/L (ref 150–450)
RBC # BLD AUTO: 3.88 10E12/L (ref 4.4–5.9)
WBC # BLD AUTO: 11.1 10E9/L (ref 4–11)

## 2018-05-26 PROCEDURE — A9270 NON-COVERED ITEM OR SERVICE: HCPCS | Mod: GY | Performed by: SURGERY

## 2018-05-26 PROCEDURE — 25000132 ZZH RX MED GY IP 250 OP 250 PS 637: Mod: GY | Performed by: SURGERY

## 2018-05-26 PROCEDURE — 25000132 ZZH RX MED GY IP 250 OP 250 PS 637: Mod: GY | Performed by: PHYSICIAN ASSISTANT

## 2018-05-26 PROCEDURE — 85025 COMPLETE CBC W/AUTO DIFF WBC: CPT | Performed by: PHYSICIAN ASSISTANT

## 2018-05-26 PROCEDURE — 00000146 ZZHCL STATISTIC GLUCOSE BY METER IP

## 2018-05-26 PROCEDURE — 12000007 ZZH R&B INTERMEDIATE

## 2018-05-26 PROCEDURE — A9270 NON-COVERED ITEM OR SERVICE: HCPCS | Mod: GY | Performed by: PHYSICIAN ASSISTANT

## 2018-05-26 PROCEDURE — 25000128 H RX IP 250 OP 636: Performed by: SURGERY

## 2018-05-26 PROCEDURE — 36415 COLL VENOUS BLD VENIPUNCTURE: CPT | Performed by: PHYSICIAN ASSISTANT

## 2018-05-26 PROCEDURE — 25000128 H RX IP 250 OP 636: Performed by: PHYSICIAN ASSISTANT

## 2018-05-26 PROCEDURE — 82565 ASSAY OF CREATININE: CPT | Performed by: PHYSICIAN ASSISTANT

## 2018-05-26 RX ORDER — SODIUM CHLORIDE, SODIUM LACTATE, POTASSIUM CHLORIDE, CALCIUM CHLORIDE 600; 310; 30; 20 MG/100ML; MG/100ML; MG/100ML; MG/100ML
INJECTION, SOLUTION INTRAVENOUS ONCE
Status: COMPLETED | OUTPATIENT
Start: 2018-05-26 | End: 2018-05-26

## 2018-05-26 RX ORDER — SIMETHICONE 40MG/0.6ML
133 SUSPENSION, DROPS(FINAL DOSAGE FORM)(ML) ORAL 4 TIMES DAILY
Status: DISCONTINUED | OUTPATIENT
Start: 2018-05-26 | End: 2018-06-02 | Stop reason: HOSPADM

## 2018-05-26 RX ADMIN — ENOXAPARIN SODIUM 40 MG: 40 INJECTION SUBCUTANEOUS at 09:15

## 2018-05-26 RX ADMIN — LISINOPRIL AND HYDROCHLOROTHIAZIDE 1 TABLET: 25; 20 TABLET ORAL at 09:15

## 2018-05-26 RX ADMIN — SODIUM CHLORIDE 1000 ML: 9 INJECTION, SOLUTION INTRAVENOUS at 09:16

## 2018-05-26 RX ADMIN — POTASSIUM CHLORIDE AND SODIUM CHLORIDE: 900; 150 INJECTION, SOLUTION INTRAVENOUS at 14:00

## 2018-05-26 RX ADMIN — SODIUM CHLORIDE, POTASSIUM CHLORIDE, SODIUM LACTATE AND CALCIUM CHLORIDE: 600; 310; 30; 20 INJECTION, SOLUTION INTRAVENOUS at 22:24

## 2018-05-26 RX ADMIN — SIMETHICONE 133 MG: 20 EMULSION ORAL at 19:24

## 2018-05-26 RX ADMIN — SIMETHICONE 133 MG: 20 EMULSION ORAL at 22:24

## 2018-05-26 RX ADMIN — POTASSIUM CHLORIDE AND SODIUM CHLORIDE: 900; 150 INJECTION, SOLUTION INTRAVENOUS at 03:47

## 2018-05-26 RX ADMIN — DIATRIZOATE MEGLUMINE AND DIATRIZOATE SODIUM 90 ML: 660; 100 SOLUTION ORAL; RECTAL at 21:21

## 2018-05-26 RX ADMIN — SODIUM CHLORIDE, POTASSIUM CHLORIDE, SODIUM LACTATE AND CALCIUM CHLORIDE: 600; 310; 30; 20 INJECTION, SOLUTION INTRAVENOUS at 17:49

## 2018-05-26 RX ADMIN — SENNOSIDES AND DOCUSATE SODIUM 2 TABLET: 8.6; 5 TABLET ORAL at 09:15

## 2018-05-26 NOTE — PROGRESS NOTES
"[facility]  GENERAL SURGERY Progress Note    Admission Date: 2018         Assessment and Plan:   Hussein Hayes is a 82 year old male S/P Procedure(s):  LAPAROTOMY EXPLORATORY  GASTROJEJUNOSTOMY, 3 Days Post-Op.    No acute events. High output from NGT and low UOP. Cosem in place for urinary retention.       - fluid bolus this morning  - NPO, NGT LIS  - Pain meds PRN  - SCD/ Lovenox  - Continue cosme, will start flomax when able to take po  - Labs ok  - Encourage ambulation and IS             Interval History:   Overall doing well, pain controlled and no concerns this morning. Is not yet passing gas. Has been up walking the halls.                      Physical Exam:   Blood pressure 135/70, pulse 98, temperature 98.1  F (36.7  C), temperature source Oral, resp. rate 16, height 1.702 m (5' 7\"), weight 54.6 kg (120 lb 5.9 oz), SpO2 94 %.  Temperature Temp  Av  F (36.7  C)  Min: 97.6  F (36.4  C)  Max: 98.7  F (37.1  C)   I/O last 3 completed shifts:  In: 3336 [P.O.:440; I.V.:2896]  Out: 3475 [Urine:775; Emesis/NG output:2700]  Constitutional:  Awake, alert, oriented, and in no apparent distress.   Lungs: No increased work of breathing, good air exchange, clear to auscultation bilaterally, and no crackles or wheezing.   Cardiovascular: Regular rate and rhythm, normal S1 and S2, and no murmur noted.   Abdomen: Soft, mild distended, appropriately tender at incision(s), steri strips in place with no concern for infection.    Wound(s): Clean, dry, and intact. No erythema or drainage.    Extremities: No edema or calf tenderness. +SCDs          Data:     Recent Labs   Lab Test  18   0640  18   0715  18   0859   WBC  11.1*  15.5*   --    HGB  11.5*  12.7*  13.2*   HCT  34.6*  36.5*   --    PLT  238  240  267      Recent Labs   Lab Test  18   0640  18   0715  18   0859   NA   --   130*   --    POTASSIUM   --   4.1  3.8   CHLORIDE   --   97   --  "   CO2   --   21   --    BUN   --   20   --    CR  1.14  1.19  1.29*     No lab results found.   No lab results found.  Recent Labs   Lab Test  05/24/18 0715   LUBNA  8.5     Recent Labs   Lab Test  05/24/18 0715   ANIONGAP  12       Farrah Leos MD- General Surgery Resident.   [facility]  [phone]

## 2018-05-26 NOTE — PLAN OF CARE
Problem: Patient Care Overview  Goal: Plan of Care/Patient Progress Review  Outcome: No Change  A&O.  VSS, RA.  Abd incision CDI, binder in place.  Denies pain.  PCA dilaudid available, has not used whole shift.  Morning shift tried to clamp NG, pt did not tolerate it well, back on LIS with 600 ml output.  Up SBA, ambulated in hallway x 1.  IV infusing 125 ml/hr.  Araya placed around 1900, patent and intact.  Lactic acid 2.3, MD aware, see note.  Continue to monitor.

## 2018-05-26 NOTE — PLAN OF CARE
Problem: Patient Care Overview  Goal: Plan of Care/Patient Progress Review  Outcome: Improving  Oriented. Ambulated in wing. Incision c/d/i. Binder on. IVF's. Dilaudid pca infrequently used. Taking ice chips. VSS.

## 2018-05-26 NOTE — PROGRESS NOTES
"Oncology/Hematology Follow Up Note:    Assessment and Plan:  Karlos is an 82 year old man with nausea, vomiting, early satiety and weight loss due to an obstructing duodenal adenocarcinoma.. He was admitted for abdominal exploration.     1.  Metastatic duodenal adenocarcinoma  - s/p gastrojejunostomy  - Disease is unresectable  - Karlos is feeling well post-op day #3  - He has minimal pain, but has not passed gas yet and continues to have high NG output.    PLAN:  - Needs to heal and recover from surgery before considering chemotherapy.  - Karlos should see Dr. Siddiqi in 2-3 weeks after discharge to discuss chemotherapy  - The patient understands this is treatable, but not curable    Derrick Barlow M.D.  [facility]  [phone]      Subjective:    Patient feels better today.  Pain is well controlled.  Not yet passing gas.  Continues to have high NG output.    Scheduled Medications:  Reviewed active medications    Labs:  CBC RESULTS:   Recent Labs   Lab Test  05/26/18   0640  05/24/18   0715   05/23/18   0859   WBC  11.1*  15.5*   --    --    HGB  11.5*  12.7*   --   13.2*   HCT  34.6*  36.5*   --    --    MCV  89  87   < >   --    PLT  238  240   --   267    < > = values in this interval not displayed.       CMP  Recent Labs  Lab 05/26/18  0640 05/24/18  0715 05/23/18  0859   NA  --  130*  --    POTASSIUM  --  4.1 3.8   CHLORIDE  --  97  --    CO2  --  21  --    ANIONGAP  --  12  --    GLC  --  110*  --    BUN  --  20  --    CR 1.14 1.19 1.29*   GFRESTIMATED 62 59* 53*   GFRESTBLACK 74 71 65   LUBNA  --  8.5  --        INRNo lab results found in last 7 days.    Objective/Physical Exam:  Blood pressure 125/84, pulse 77, temperature 97.4  F (36.3  C), temperature source Oral, resp. rate 16, height 1.702 m (5' 7\"), weight 54.6 kg (120 lb 5.9 oz), SpO2 96 %.  General appearance:alert, oriented, no acute distress  HEENT:sclera anicteric  Lungs: chest is clear to auscultation  Abdomen: soft, nondistended.  Appropriately tender " at surgical incisions, which are clean, dry, and intact.  Steri-strips in place.    Derrick Barlow MD  [facility]  5/26/2018 5:26 PM

## 2018-05-26 NOTE — PLAN OF CARE
Problem: Patient Care Overview  Goal: Plan of Care/Patient Progress Review  AVSS. Denies pain. Incision ROCIO with steri strips. LS clear equal; encouraged pulmonary toileting . Tolerating Comfort clears. Up with assist of 1 and gait belt. BS hypo; NO flatus; Araya with Good Urine output.

## 2018-05-27 ENCOUNTER — APPOINTMENT (OUTPATIENT)
Dept: GENERAL RADIOLOGY | Facility: CLINIC | Age: 83
DRG: 327 | End: 2018-05-27
Attending: SURGERY
Payer: MEDICARE

## 2018-05-27 LAB
ANION GAP SERPL CALCULATED.3IONS-SCNC: 10 MMOL/L (ref 3–14)
BUN SERPL-MCNC: 28 MG/DL (ref 7–30)
CALCIUM SERPL-MCNC: 9.2 MG/DL (ref 8.5–10.1)
CHLORIDE SERPL-SCNC: 106 MMOL/L (ref 94–109)
CO2 SERPL-SCNC: 26 MMOL/L (ref 20–32)
CREAT SERPL-MCNC: 1.41 MG/DL (ref 0.66–1.25)
ERYTHROCYTE [DISTWIDTH] IN BLOOD BY AUTOMATED COUNT: 13.3 % (ref 10–15)
GFR SERPL CREATININE-BSD FRML MDRD: 48 ML/MIN/1.7M2
GLUCOSE SERPL-MCNC: 130 MG/DL (ref 70–99)
HCT VFR BLD AUTO: 38.1 % (ref 40–53)
HGB BLD-MCNC: 12.7 G/DL (ref 13.3–17.7)
LACTATE BLD-SCNC: 1.3 MMOL/L (ref 0.4–1.9)
LDLC SERPL DIRECT ASSAY-MCNC: 68 MG/DL
MAGNESIUM SERPL-MCNC: 1.3 MG/DL (ref 1.6–2.3)
MAGNESIUM SERPL-MCNC: 2.5 MG/DL (ref 1.6–2.3)
MCH RBC QN AUTO: 30.2 PG (ref 26.5–33)
MCHC RBC AUTO-ENTMCNC: 33.3 G/DL (ref 31.5–36.5)
MCV RBC AUTO: 91 FL (ref 78–100)
PHOSPHATE SERPL-MCNC: 3.3 MG/DL (ref 2.5–4.5)
PLATELET # BLD AUTO: 288 10E9/L (ref 150–450)
POTASSIUM SERPL-SCNC: 4.1 MMOL/L (ref 3.4–5.3)
RBC # BLD AUTO: 4.2 10E12/L (ref 4.4–5.9)
SODIUM SERPL-SCNC: 142 MMOL/L (ref 133–144)
TROPONIN I SERPL-MCNC: 0.99 UG/L (ref 0–0.04)
TROPONIN I SERPL-MCNC: 1.12 UG/L (ref 0–0.04)
TROPONIN I SERPL-MCNC: 1.2 UG/L (ref 0–0.04)
WBC # BLD AUTO: 12.7 10E9/L (ref 4–11)

## 2018-05-27 PROCEDURE — A9270 NON-COVERED ITEM OR SERVICE: HCPCS | Mod: GY | Performed by: SURGERY

## 2018-05-27 PROCEDURE — P9041 ALBUMIN (HUMAN),5%, 50ML: HCPCS | Performed by: SURGERY

## 2018-05-27 PROCEDURE — 12000007 ZZH R&B INTERMEDIATE

## 2018-05-27 PROCEDURE — 25000132 ZZH RX MED GY IP 250 OP 250 PS 637: Mod: GY | Performed by: INTERNAL MEDICINE

## 2018-05-27 PROCEDURE — A9270 NON-COVERED ITEM OR SERVICE: HCPCS | Mod: GY | Performed by: PHYSICIAN ASSISTANT

## 2018-05-27 PROCEDURE — 25000132 ZZH RX MED GY IP 250 OP 250 PS 637: Mod: GY | Performed by: SURGERY

## 2018-05-27 PROCEDURE — 99207 ZZC CONSULT E&M CHANGED TO INITIAL LEVEL: CPT | Performed by: HOSPITALIST

## 2018-05-27 PROCEDURE — 85027 COMPLETE CBC AUTOMATED: CPT | Performed by: SURGERY

## 2018-05-27 PROCEDURE — 25000132 ZZH RX MED GY IP 250 OP 250 PS 637: Mod: GY | Performed by: PHYSICIAN ASSISTANT

## 2018-05-27 PROCEDURE — 25000128 H RX IP 250 OP 636: Performed by: SURGERY

## 2018-05-27 PROCEDURE — 80048 BASIC METABOLIC PNL TOTAL CA: CPT | Performed by: SURGERY

## 2018-05-27 PROCEDURE — 83735 ASSAY OF MAGNESIUM: CPT | Performed by: PHYSICIAN ASSISTANT

## 2018-05-27 PROCEDURE — 36415 COLL VENOUS BLD VENIPUNCTURE: CPT | Performed by: SURGERY

## 2018-05-27 PROCEDURE — 74018 RADEX ABDOMEN 1 VIEW: CPT

## 2018-05-27 PROCEDURE — 83605 ASSAY OF LACTIC ACID: CPT | Performed by: PHYSICIAN ASSISTANT

## 2018-05-27 PROCEDURE — 84484 ASSAY OF TROPONIN QUANT: CPT | Performed by: SURGERY

## 2018-05-27 PROCEDURE — 99223 1ST HOSP IP/OBS HIGH 75: CPT | Performed by: HOSPITALIST

## 2018-05-27 PROCEDURE — 25000128 H RX IP 250 OP 636: Performed by: PHYSICIAN ASSISTANT

## 2018-05-27 PROCEDURE — 84484 ASSAY OF TROPONIN QUANT: CPT | Performed by: PHYSICIAN ASSISTANT

## 2018-05-27 PROCEDURE — A9270 NON-COVERED ITEM OR SERVICE: HCPCS | Mod: GY | Performed by: INTERNAL MEDICINE

## 2018-05-27 PROCEDURE — 83735 ASSAY OF MAGNESIUM: CPT | Performed by: SURGERY

## 2018-05-27 PROCEDURE — 71046 X-RAY EXAM CHEST 2 VIEWS: CPT

## 2018-05-27 PROCEDURE — 93010 ELECTROCARDIOGRAM REPORT: CPT | Performed by: INTERNAL MEDICINE

## 2018-05-27 PROCEDURE — 36415 COLL VENOUS BLD VENIPUNCTURE: CPT | Performed by: PHYSICIAN ASSISTANT

## 2018-05-27 PROCEDURE — 93005 ELECTROCARDIOGRAM TRACING: CPT

## 2018-05-27 PROCEDURE — 99207 ZZC APP CREDIT; MD BILLING SHARED VISIT: CPT | Performed by: PHYSICIAN ASSISTANT

## 2018-05-27 PROCEDURE — 84100 ASSAY OF PHOSPHORUS: CPT | Performed by: SURGERY

## 2018-05-27 PROCEDURE — 25000125 ZZHC RX 250: Performed by: PHYSICIAN ASSISTANT

## 2018-05-27 PROCEDURE — 83721 ASSAY OF BLOOD LIPOPROTEIN: CPT | Performed by: SURGERY

## 2018-05-27 RX ORDER — SODIUM CHLORIDE, SODIUM LACTATE, POTASSIUM CHLORIDE, CALCIUM CHLORIDE 600; 310; 30; 20 MG/100ML; MG/100ML; MG/100ML; MG/100ML
INJECTION, SOLUTION INTRAVENOUS ONCE
Status: DISCONTINUED | OUTPATIENT
Start: 2018-05-27 | End: 2018-05-27 | Stop reason: CLARIF

## 2018-05-27 RX ORDER — METOPROLOL TARTRATE 1 MG/ML
5 INJECTION, SOLUTION INTRAVENOUS EVERY 6 HOURS
Status: DISCONTINUED | OUTPATIENT
Start: 2018-05-27 | End: 2018-05-28

## 2018-05-27 RX ORDER — METOPROLOL TARTRATE 25 MG/1
25 TABLET, FILM COATED ORAL 2 TIMES DAILY
Status: DISCONTINUED | OUTPATIENT
Start: 2018-05-27 | End: 2018-05-28

## 2018-05-27 RX ORDER — FUROSEMIDE 10 MG/ML
10 INJECTION INTRAMUSCULAR; INTRAVENOUS ONCE
Status: COMPLETED | OUTPATIENT
Start: 2018-05-27 | End: 2018-05-27

## 2018-05-27 RX ORDER — ASPIRIN 300 MG/1
300 SUPPOSITORY RECTAL ONCE
Status: COMPLETED | OUTPATIENT
Start: 2018-05-27 | End: 2018-05-27

## 2018-05-27 RX ORDER — MAGNESIUM SULFATE HEPTAHYDRATE 40 MG/ML
4 INJECTION, SOLUTION INTRAVENOUS EVERY 4 HOURS PRN
Status: DISCONTINUED | OUTPATIENT
Start: 2018-05-27 | End: 2018-06-02 | Stop reason: HOSPADM

## 2018-05-27 RX ORDER — LEVALBUTEROL 1.25 MG/.5ML
0.31 SOLUTION, CONCENTRATE RESPIRATORY (INHALATION) EVERY 6 HOURS PRN
Status: DISCONTINUED | OUTPATIENT
Start: 2018-05-27 | End: 2018-06-02 | Stop reason: HOSPADM

## 2018-05-27 RX ORDER — ALBUMIN, HUMAN INJ 5% 5 %
12.5 SOLUTION INTRAVENOUS ONCE
Status: COMPLETED | OUTPATIENT
Start: 2018-05-27 | End: 2018-05-28

## 2018-05-27 RX ADMIN — ASPIRIN 300 MG: 300 SUPPOSITORY RECTAL at 18:30

## 2018-05-27 RX ADMIN — ENOXAPARIN SODIUM 40 MG: 40 INJECTION SUBCUTANEOUS at 07:56

## 2018-05-27 RX ADMIN — POTASSIUM CHLORIDE AND SODIUM CHLORIDE: 900; 150 INJECTION, SOLUTION INTRAVENOUS at 04:52

## 2018-05-27 RX ADMIN — MAGNESIUM SULFATE IN WATER 4 G: 40 INJECTION, SOLUTION INTRAVENOUS at 17:51

## 2018-05-27 RX ADMIN — ALBUMIN HUMAN 12.5 G: 0.05 INJECTION, SOLUTION INTRAVENOUS at 22:21

## 2018-05-27 RX ADMIN — SENNOSIDES AND DOCUSATE SODIUM 1 TABLET: 8.6; 5 TABLET ORAL at 22:20

## 2018-05-27 RX ADMIN — METOPROLOL TARTRATE 5 MG: 5 INJECTION, SOLUTION INTRAVENOUS at 18:30

## 2018-05-27 RX ADMIN — SODIUM CHLORIDE, POTASSIUM CHLORIDE, SODIUM LACTATE AND CALCIUM CHLORIDE 450 ML: 600; 310; 30; 20 INJECTION, SOLUTION INTRAVENOUS at 15:02

## 2018-05-27 RX ADMIN — FUROSEMIDE 10 MG: 10 INJECTION, SOLUTION INTRAMUSCULAR; INTRAVENOUS at 16:56

## 2018-05-27 RX ADMIN — SODIUM CHLORIDE, POTASSIUM CHLORIDE, SODIUM LACTATE AND CALCIUM CHLORIDE 400 ML: 600; 310; 30; 20 INJECTION, SOLUTION INTRAVENOUS at 05:32

## 2018-05-27 RX ADMIN — SIMETHICONE 133 MG: 20 EMULSION ORAL at 13:19

## 2018-05-27 RX ADMIN — ONDANSETRON 4 MG: 2 INJECTION INTRAMUSCULAR; INTRAVENOUS at 03:30

## 2018-05-27 RX ADMIN — SIMETHICONE 133 MG: 20 EMULSION ORAL at 18:30

## 2018-05-27 RX ADMIN — SIMETHICONE 133 MG: 20 EMULSION ORAL at 22:20

## 2018-05-27 RX ADMIN — METOPROLOL TARTRATE 25 MG: 25 TABLET ORAL at 22:50

## 2018-05-27 RX ADMIN — POTASSIUM CHLORIDE AND SODIUM CHLORIDE: 900; 150 INJECTION, SOLUTION INTRAVENOUS at 12:26

## 2018-05-27 NOTE — PROGRESS NOTES
Oncology/Hematology Chart Check:    Reviewed lab results and note from surgery.    - No new recommendations at this time  - Needs to heal and recover from surgery before considering chemotherapy.  - Karlos should see Dr. Siddiqi in 2-3 weeks after discharge to discuss chemotherapy    Derrick Barlow MD  [facility]  5/27/2018 12:24 PM

## 2018-05-27 NOTE — PLAN OF CARE
Problem: Patient Care Overview  Goal: Plan of Care/Patient Progress Review  Outcome: No Change  Vital Signs stable. Abdominal incision with steri strips and mild erythremia surrounding site. Bowel sounds hypoactive and not passing gas. Abdominal binder in place. NG tube was clamped until 0100 and the pt then became nauseated. The NG tube was then connected back to low intermittent suction with 50ml immediately coming out. NG total output for the shift was 200. A 400 mL bolus of LR was given per the patient order for the NG output ratio. Araya is patent with low urine output (150mL). Lung sounds diminished and IS to 1000. PT denies pain but still has PCA Dilaudid available. Up with assist of 1.

## 2018-05-27 NOTE — PROGRESS NOTES
"[facility]  GENERAL SURGERY Progress Note    Admission Date: 2018         Assessment and Plan:   Hussein Hayes is a 82 year old male S/P Procedure(s):  LAPAROTOMY EXPLORATORY  GASTROJEJUNOSTOMY, 4 Days Post-Op.    NGT output slowly decreasing. Given gastrografin last night with plan for AXR this morning to evaluate anastomosis. Will also evaluate placement of NGT at that time with reports that it partially fell out last night. No other acute concerns, ambulating and pain well controlled. Not yet passing gas.     - follow up AXR this morning.   -  NGT LIS, ok for clears  - Pain meds PRN  - SCD/ Lovenox  - Continue cosme, will start flomax when able to take po and try another trial of void  - Labs ok  - Encourage ambulation and IS             Interval History:   Doing ok, is frustrated with the NG tube still being in place. Is continuing to walk the halls without difficulty, pain well controlled. Tolerating some sips of clears for comfort. No acute concerns this morning.                      Physical Exam:   Blood pressure 94/54, pulse 77, temperature 97.6  F (36.4  C), temperature source Oral, resp. rate 16, height 1.702 m (5' 7\"), weight 56.2 kg (123 lb 14.4 oz), SpO2 94 %.  Temperature Temp  Av  F (36.7  C)  Min: 97.6  F (36.4  C)  Max: 98.7  F (37.1  C)   I/O last 3 completed shifts:  In: 2603 [P.O.:120; I.V.:2483]  Out: 1950 [Urine:800; Emesis/NG output:1150]  Constitutional:  Awake, alert, oriented, and in no apparent distress.   Lungs: No increased work of breathing, good air exchange, clear to auscultation bilaterally, and no crackles or wheezing.   Cardiovascular: Regular rate and rhythm, normal S1 and S2, and no murmur noted.   Abdomen: Soft, mild distended, non tender, binder in place. , steri strips in place with no concern for infection.    Wound(s): Clean, dry, and intact. No erythema or drainage.    Extremities: No edema or calf tenderness. +SCDs          " Data:     Recent Labs   Lab Test  05/26/18 0640 05/24/18 0715  05/23/18   0859   WBC  11.1*  15.5*   --    HGB  11.5*  12.7*  13.2*   HCT  34.6*  36.5*   --    PLT  238  240  267      Recent Labs   Lab Test  05/26/18 0640 05/24/18 0715  05/23/18   0859   NA   --   130*   --    POTASSIUM   --   4.1  3.8   CHLORIDE   --   97   --    CO2   --   21   --    BUN   --   20   --    CR  1.14  1.19  1.29*     No lab results found.   No lab results found.  Recent Labs   Lab Test  05/24/18 0715   LUBNA  8.5     Recent Labs   Lab Test  05/24/18 0715   ANIONGAP  12       Farrah Leos MD- General Surgery Resident.   [facility]  [phone]

## 2018-05-27 NOTE — PLAN OF CARE
Problem: Patient Care Overview  Goal: Plan of Care/Patient Progress Review  VSS, Lung sounds diminished. NG tube output 900 ml- started fluid bolus of LR. Bowel sounds active, passing gas. Had 1 loose stool this shift. Abdominal incision ROCIO with steri strips. Continues to c/o intermittent nausea. Abdominal binder intact. AUNDREA- see MD orders from Dr. Berg. Ambulates the hallway with SBA. Has PCA but did not use it this shift. Taking in clears-water.

## 2018-05-27 NOTE — CONSULTS
[facility]    Hospitalist Consultation    Date of Admission:  5/23/2018    Assessment & Plan   Hussein Hayes is a 82 year old male who was admitted on 5/23/2018. I was asked to see the patient as a stat consult for Afib.    # New Onset Afib with troponin leak likely demand ischemia?: EKG showing Afib with rate 98 bpm. Currently asymptomatic. Denies chest pains, SOB, or heart palpitations.  Does endorse heartburn as usual however daughter states that she feels that it is worse today.  No hx per pt but does have a family hx including in his father. [id number]: 3 for male, >76 yo, and HTN. Lactate 1.3. HR 90s-130s, highest 150 on tele.  -Cardiology consult ASAP, paged fellow and awaiting return page: question whether pt needs cath lab and to start heparin; ok to start heparin drip if needed per general surgery Dr. Berg but recommending to avoid heparin bolus  -IV Metoprolol 5mg now and Q 6 hrs with hold parameters  -Mg++ 1.3: Replete with magnesium sulfate 4gm   -Aspirin 300 mg rectally ×1 - discussed with surgeon Dr. Berg, okay to give rectally but likely unable to absorb via p.o.  -Trend troponins Q 4 x3, next trop at 1900 read as 1.125  -Telemetry  -Echocardiogram  -CXR for decreased breath sounds to rule out flash pulmonary edema  -CBC unremarkable, mild leukocytosis c/w prior post op, mild anemia improved  -Lipid panel  -[id number] score 3 c/w 3.2% risk  -If remains in Afib x48 hrs and not converted will need AC    Addendum: Spoke with cardiology fellow and reviewed case. Will hold heparin drip at this time as the benefits may outweigh the risks in this postop day # 4 patient with metastatic intestinal adenocarcinoma. Plan for ECHO in AM. Rate control with IV metoprolol. Cardiology to complete formal consult in AM. CXR without acute changes.    # Acute Kidney Injury: Cr bumped up 1.41, up from 1.14 yesterday. Up ~ 4lbs since yesterday. Bilateral foot/ankle swelling on exam. No SOB.  Cosme in place. Given Lasix 10 mg x1. Noted Hx BPH. Surgical plans to start flomax and remove cosme when able.  -Avoid NSAIDs after aspirin if able  -I & Os  -Recheck BMP in AM, monitor and may need Nephrology vs. 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 Takes Lisinopril/HCTZ 20/25 mg at home PTA. Has been held due to NPO.  -Telemetry    # Hx COPD:   -Titrate O2 PRN  -Continue PTA Anoro Ellipta  -Xopenex nebs PRN (rather than albuterol due to avoiding tachycardia)    DVT Prophylaxis: Enoxaparin (Lovenox) SQ and Pneumatic Compression Devices    Code Status: Full Code    Disposition: Expected discharge per primary team General Surgery    This patient was discussed with Dr. Paco Vegas of the Hospitalist Service who agrees with current plans as outlined above.    Gabrielle Alvarado PA-C  Hospitalist Physician Assistant  Pager: [phone]      Reason for Consult   Reason for consult: I was asked by Surgery to evaluate this patient for new onset AFib.    Primary Care Physician   Kenneth G. Pallas    Chief Complaint   New onset Afib    History is obtained from the patient and electronic health record    History of Present Illness   Hussein Hayes is a 82 year old male who is post op team #4 status post exploratory laparotomy, lysis of adhesions, mesenteric biopsy, gastro-jejunostomy due to metastatic adenocarcinoma of small intestine.  Consulted by general surgery as above due to new onset A. fib today.  Per discussion with nurse and review of notes patient noted to have in a regular rhythm on exam therefore he was placed on telemetry and found to have normal sinus rhythm with PVCs.  HR ranging 90s-130s. SBP 120s-130s. An EKG was done showing atrial fibrillation with a rate of 98 bpm, flattening of T waves in anterior leads, and QTc 400  ms.  Preliminary workup started by surgery.  Noted to have magnesium 1.3.  And troponin I 1.196. Breath sounds diminished with bilateral feet and ankle swelling 1-2+ pitting edema and pt ~4 lbs up therefore given Lasix 10 mg IV x1. Cr also bumped up today to 1.41 from 1.14 yesterday. No hx renal dysfunction.     During my visit patient feeling well without acute changes since surgery.  He denies shortness of breath, heart palpitations, or chest discomfort.  He does note a feeling of heartburn which he is gotten persistently and chronically.  Daughter does report she feels heartburn has been worse today.  NG tube in place during exam.    Past Medical History    I have reviewed this patient's medical history and updated it with pertinent information if needed.   Past Medical History:   Diagnosis Date     BPH (benign prostatic hyperplasia)      COPD (chronic obstructive pulmonary disease) (H)      Hypertension     No cardiologist     Malignant neoplasm of small intestine (H)        Past Surgical History   I have reviewed this patient's surgical history and updated it with pertinent information if needed.  Past Surgical History:   Procedure Laterality Date     APPENDECTOMY       CATARACT IOL, RT/LT Bilateral      ESOPHAGOSCOPY, GASTROSCOPY, DUODENOSCOPY (EGD), COMBINED N/A 5/4/2018    Procedure: COMBINED ESOPHAGOSCOPY, GASTROSCOPY, DUODENOSCOPY (EGD);  Esophagoscopy, gastroscopy, duodenoscopy and duodenal mass biopsies ;  Surgeon: Randall Wheatley MD;  Location: [facility] OR     GASTROJEJUNOSTOMY N/A 5/23/2018    Procedure: GASTROJEJUNOSTOMY;;  Surgeon: Kenneth Berg MD;  Location:  OR     LAPAROTOMY EXPLORATORY N/A 5/23/2018    Procedure: LAPAROTOMY EXPLORATORY;  ABDOMINAL EXPLORATION.  MESSENTARY BIOSPY.  GASTROJEJUNOSTOMY;  Surgeon: Kenneth Berg MD;  Location:  OR       Prior to Admission Medications   Prior to Admission Medications   Prescriptions Last Dose Informant Patient Reported? Taking?    lisinopril-hydrochlorothiazide (PRINZIDE/ZESTORETIC) 20-25 MG per tablet 5/23/2018 at 0600 Self Yes Yes   Sig: Take 1 tablet by mouth daily   umeclidinium-vilanterol (ANORO ELLIPTA) 62.5-25 MCG/INH oral inhaler 5/21/2018 at prn Self Yes Yes   Sig: Inhale 1 puff into the lungs daily as needed       Facility-Administered Medications: None     Allergies   Allergies   Allergen Reactions     Penicillins Hives       Social History   I have reviewed this patient's social history and updated it with pertinent information if needed. Hussein Hayes  reports that he has quit smoking. His smoking use included Cigarettes. He has a 40.00 pack-year smoking history. He has never used smokeless tobacco. He reports that he drinks alcohol. He reports that he does not use illicit drugs.    Family History   Pt reports Afib in father and aunt. Father also had 3vCABG around 85-91 yo.    Review of Systems   The 10 point Review of Systems is negative other than noted in the HPI.     Physical Exam   Temp: 97.5  F (36.4  C) Temp src: Axillary BP: 129/90 Pulse: 138 (HR on Monitor up to 138 with PAC and PVC's- MD updated) Heart Rate: 102 Resp: 18 SpO2: 93 % O2 Device: None (Room air)    Vital Signs with Ranges  Temp:  [97.5  F (36.4  C)-97.8  F (36.6  C)] 97.5  F (36.4  C)  Pulse:  [] 138  Heart Rate:  [] 102  Resp:  [16-18] 18  BP: ()/(54-92) 129/90  SpO2:  [93 %-96 %] 93 %  123 lbs 14.38 oz    Constitutional: Awake, alert, cooperative, no apparent distress.  Eyes: Conjunctiva and pupils examined and normal. Lt pupil with deformity and sluggish due to prior retinal detachment.  HEENT: Moist mucous membranes, normal dentition.  Respiratory: Decreased breath sounds bilaterally, no crackles or wheezing. Exam limited by difficulty sitting forward due to abd binder.   Cardiovascular: Irregular rate and rhythm, normal S1 and S2, and no murmur noted.  GI: Soft but mildy firm/distended, no guarding, non-tender, normal bowel  sounds. NGT to suction of dark liquid. Abd binder in place at upper fields, limited assessment.  Skin: No rashes, no cyanosis. Bilateral foot/ankle 1-2+ pitting edema.   Musculoskeletal: No joint swelling, erythema or tenderness.  Neurologic: Cranial nerves 2-12 intact, normal strength and sensation.  Psychiatric: Alert, oriented to person, place and time, no obvious anxiety or depression.    Data   -Data reviewed today: All pertinent laboratory and imaging results from this encounter were reviewed. I personally reviewed the EKG tracing showing AFib with flattening of anterior leads, 98 bpm, and QTc 400 ms..    Recent Labs  Lab 05/27/18  1800 05/27/18  1500 05/26/18  0640 05/24/18  0715 05/23/18  0859   WBC  --  12.7* 11.1* 15.5*  --    HGB  --  12.7* 11.5* 12.7* 13.2*   MCV  --  91 89 87  --    PLT  --  288 238 240 267   NA  --  142  --  130*  --    POTASSIUM  --  4.1  --  4.1 3.8   CHLORIDE  --  106  --  97  --    CO2  --  26  --  21  --    BUN  --  28  --  20  --    CR  --  1.41* 1.14 1.19 1.29*   ANIONGAP  --  10  --  12  --    LUBNA  --  9.2  --  8.5  --    GLC  --  130*  --  110*  --    TROPI 1.125* 1.196*  --   --   --        Imaging:  Recent Results (from the past 24 hour(s))   XR Abdomen 1 View    Narrative    ABDOMEN ONE VIEW  5/27/2018 11:48 AM     HISTORY: Follow up post op ileus.     COMPARISON: None.      Impression    IMPRESSION: Scattered areas of intermediate and high density are seen  scattered throughout the abdomen which are presumably all related to  contrast material. There is a nasogastric tube with its tip projecting  in the stomach. There is some free air in the region of the biliary  tree and in the region of the subhepatic space but this is all  presumably postoperative. Bowel gas pattern is difficult to evaluate  due to the different densities of contrast material. There is no  evidence for colonic obstruction. Small bowel loops appear to be  minimally prominent.    TRA VERDUZCO MD   XR  Chest 2 Views    Narrative    CHEST TWO VIEWS    5/27/2018 7:42 PM     COMPARISON: None.    HISTORY: Question fluid overload.      FINDINGS: There are emphysematous and fibrotic changes throughout both  lungs. There are no airspace opacities to suggest pneumonia. There is  no pleural effusion or pneumothorax. Heart size is normal.     FLO RESENDEZ MD

## 2018-05-27 NOTE — PLAN OF CARE
Problem: Patient Care Overview  Goal: Plan of Care/Patient Progress Review  A/OX4. VSS. Abd incision, w/ steri-strips, BS faint, not passing gas, abd binder in place. Poor appetite. NGT clamped at 2130hrs. IV LR given as per NGT output ratio. Araya patent, w/ adequate UOP. Denies pain, PCA dilaudid available, pt not using it. Ambulating in the hallway w/ A1.

## 2018-05-27 NOTE — PROVIDER NOTIFICATION
Call placed to Dr. Berg- Patient urine output 100 ml this shift. Given 425 ml of LR d/t NG output of 900. Writer put patient on tele as his heart rhythm sounded irregular- Tele patient was SR with frequent PAC's and PVC's and HR would go as high as 130's- stat EKG was done and shows patient in Afib. BMP done - mag 1.3. Weight up almost 4 lbs since yesterday.     -Mag protocol  --Chest x ray  --Lasix 10 mg IV x 1  --Troponin  --Tele  --stop the replace NG output with LR order for now  -- Ok for hospitalist consult

## 2018-05-28 ENCOUNTER — APPOINTMENT (OUTPATIENT)
Dept: CARDIOLOGY | Facility: CLINIC | Age: 83
DRG: 327 | End: 2018-05-28
Attending: PHYSICIAN ASSISTANT
Payer: MEDICARE

## 2018-05-28 LAB
ANION GAP SERPL CALCULATED.3IONS-SCNC: 10 MMOL/L (ref 3–14)
BUN SERPL-MCNC: 29 MG/DL (ref 7–30)
CALCIUM SERPL-MCNC: 8.7 MG/DL (ref 8.5–10.1)
CHLORIDE SERPL-SCNC: 105 MMOL/L (ref 94–109)
CO2 SERPL-SCNC: 25 MMOL/L (ref 20–32)
CREAT SERPL-MCNC: 1.23 MG/DL (ref 0.66–1.25)
GFR SERPL CREATININE-BSD FRML MDRD: 56 ML/MIN/1.7M2
GLUCOSE BLDC GLUCOMTR-MCNC: 106 MG/DL (ref 70–99)
GLUCOSE BLDC GLUCOMTR-MCNC: 127 MG/DL (ref 70–99)
GLUCOSE SERPL-MCNC: 111 MG/DL (ref 70–99)
MAGNESIUM SERPL-MCNC: 2.2 MG/DL (ref 1.6–2.3)
PHOSPHATE SERPL-MCNC: 3.1 MG/DL (ref 2.5–4.5)
POTASSIUM SERPL-SCNC: 3.8 MMOL/L (ref 3.4–5.3)
SODIUM SERPL-SCNC: 140 MMOL/L (ref 133–144)
TROPONIN I SERPL-MCNC: 0.76 UG/L (ref 0–0.04)

## 2018-05-28 PROCEDURE — 25500064 ZZH RX 255 OP 636: Performed by: SURGERY

## 2018-05-28 PROCEDURE — 83735 ASSAY OF MAGNESIUM: CPT | Performed by: PHYSICIAN ASSISTANT

## 2018-05-28 PROCEDURE — 36569 INSJ PICC 5 YR+ W/O IMAGING: CPT

## 2018-05-28 PROCEDURE — 25000125 ZZHC RX 250: Performed by: INTERNAL MEDICINE

## 2018-05-28 PROCEDURE — 80048 BASIC METABOLIC PNL TOTAL CA: CPT | Performed by: PHYSICIAN ASSISTANT

## 2018-05-28 PROCEDURE — 3E0436Z INTRODUCTION OF NUTRITIONAL SUBSTANCE INTO CENTRAL VEIN, PERCUTANEOUS APPROACH: ICD-10-PCS | Performed by: SURGERY

## 2018-05-28 PROCEDURE — 84484 ASSAY OF TROPONIN QUANT: CPT | Performed by: PHYSICIAN ASSISTANT

## 2018-05-28 PROCEDURE — 40000264 ECHO COMPLETE WITH OPTISON

## 2018-05-28 PROCEDURE — 36415 COLL VENOUS BLD VENIPUNCTURE: CPT | Performed by: PHYSICIAN ASSISTANT

## 2018-05-28 PROCEDURE — 00000146 ZZHCL STATISTIC GLUCOSE BY METER IP

## 2018-05-28 PROCEDURE — 25000128 H RX IP 250 OP 636: Performed by: SURGERY

## 2018-05-28 PROCEDURE — 25000128 H RX IP 250 OP 636: Performed by: PHYSICIAN ASSISTANT

## 2018-05-28 PROCEDURE — 25000128 H RX IP 250 OP 636: Performed by: HOSPITALIST

## 2018-05-28 PROCEDURE — 27210219 ZZH KIT SHRLOCK 5FR DBL LUM PWR P

## 2018-05-28 PROCEDURE — 84100 ASSAY OF PHOSPHORUS: CPT | Performed by: PHYSICIAN ASSISTANT

## 2018-05-28 PROCEDURE — 12000007 ZZH R&B INTERMEDIATE

## 2018-05-28 PROCEDURE — 25000132 ZZH RX MED GY IP 250 OP 250 PS 637: Mod: GY | Performed by: SURGERY

## 2018-05-28 PROCEDURE — A9270 NON-COVERED ITEM OR SERVICE: HCPCS | Mod: GY | Performed by: SURGERY

## 2018-05-28 PROCEDURE — 99222 1ST HOSP IP/OBS MODERATE 55: CPT | Mod: 25 | Performed by: INTERNAL MEDICINE

## 2018-05-28 PROCEDURE — 25000125 ZZHC RX 250: Performed by: SURGERY

## 2018-05-28 PROCEDURE — 99231 SBSQ HOSP IP/OBS SF/LOW 25: CPT | Mod: 25 | Performed by: SURGERY

## 2018-05-28 PROCEDURE — 93306 TTE W/DOPPLER COMPLETE: CPT | Mod: 26 | Performed by: INTERNAL MEDICINE

## 2018-05-28 PROCEDURE — 25000125 ZZHC RX 250: Performed by: PHYSICIAN ASSISTANT

## 2018-05-28 PROCEDURE — 99232 SBSQ HOSP IP/OBS MODERATE 35: CPT | Performed by: INTERNAL MEDICINE

## 2018-05-28 RX ORDER — POTASSIUM CHLORIDE 7.45 MG/ML
10 INJECTION INTRAVENOUS
Status: DISCONTINUED | OUTPATIENT
Start: 2018-05-28 | End: 2018-06-02 | Stop reason: HOSPADM

## 2018-05-28 RX ORDER — POTASSIUM CHLORIDE 1500 MG/1
20-40 TABLET, EXTENDED RELEASE ORAL
Status: DISCONTINUED | OUTPATIENT
Start: 2018-05-28 | End: 2018-06-02 | Stop reason: HOSPADM

## 2018-05-28 RX ORDER — POTASSIUM CHLORIDE 1.5 G/1.58G
20-40 POWDER, FOR SOLUTION ORAL
Status: DISCONTINUED | OUTPATIENT
Start: 2018-05-28 | End: 2018-06-02 | Stop reason: HOSPADM

## 2018-05-28 RX ORDER — SODIUM CHLORIDE AND POTASSIUM CHLORIDE 150; 900 MG/100ML; MG/100ML
INJECTION, SOLUTION INTRAVENOUS CONTINUOUS
Status: DISCONTINUED | OUTPATIENT
Start: 2018-05-28 | End: 2018-05-28

## 2018-05-28 RX ORDER — POTASSIUM CL/LIDO/0.9 % NACL 10MEQ/0.1L
10 INTRAVENOUS SOLUTION, PIGGYBACK (ML) INTRAVENOUS
Status: DISCONTINUED | OUTPATIENT
Start: 2018-05-28 | End: 2018-06-02 | Stop reason: HOSPADM

## 2018-05-28 RX ORDER — NICOTINE POLACRILEX 4 MG
15-30 LOZENGE BUCCAL
Status: DISCONTINUED | OUTPATIENT
Start: 2018-05-28 | End: 2018-06-02 | Stop reason: HOSPADM

## 2018-05-28 RX ORDER — METOPROLOL TARTRATE 50 MG
50 TABLET ORAL 2 TIMES DAILY
Status: DISCONTINUED | OUTPATIENT
Start: 2018-05-28 | End: 2018-05-28

## 2018-05-28 RX ORDER — POTASSIUM CHLORIDE 29.8 MG/ML
20 INJECTION INTRAVENOUS
Status: DISCONTINUED | OUTPATIENT
Start: 2018-05-28 | End: 2018-06-02 | Stop reason: HOSPADM

## 2018-05-28 RX ORDER — DEXTROSE MONOHYDRATE 25 G/50ML
25-50 INJECTION, SOLUTION INTRAVENOUS
Status: DISCONTINUED | OUTPATIENT
Start: 2018-05-28 | End: 2018-06-02 | Stop reason: HOSPADM

## 2018-05-28 RX ORDER — METOPROLOL TARTRATE 1 MG/ML
5 INJECTION, SOLUTION INTRAVENOUS EVERY 6 HOURS
Status: DISCONTINUED | OUTPATIENT
Start: 2018-05-28 | End: 2018-05-31

## 2018-05-28 RX ORDER — METOPROLOL TARTRATE 1 MG/ML
5 INJECTION, SOLUTION INTRAVENOUS ONCE
Status: DISCONTINUED | OUTPATIENT
Start: 2018-05-28 | End: 2018-05-28

## 2018-05-28 RX ADMIN — METOPROLOL TARTRATE 5 MG: 5 INJECTION, SOLUTION INTRAVENOUS at 18:43

## 2018-05-28 RX ADMIN — HYDROMORPHONE HYDROCHLORIDE: 10 INJECTION, SOLUTION INTRAMUSCULAR; INTRAVENOUS; SUBCUTANEOUS at 06:02

## 2018-05-28 RX ADMIN — POTASSIUM CHLORIDE AND SODIUM CHLORIDE: 900; 150 INJECTION, SOLUTION INTRAVENOUS at 10:25

## 2018-05-28 RX ADMIN — PANTOPRAZOLE SODIUM 40 MG: 40 INJECTION, POWDER, FOR SOLUTION INTRAVENOUS at 08:12

## 2018-05-28 RX ADMIN — SIMETHICONE 133 MG: 20 EMULSION ORAL at 21:46

## 2018-05-28 RX ADMIN — POTASSIUM CHLORIDE AND SODIUM CHLORIDE: 900; 150 INJECTION, SOLUTION INTRAVENOUS at 01:36

## 2018-05-28 RX ADMIN — METOPROLOL TARTRATE 5 MG: 5 INJECTION, SOLUTION INTRAVENOUS at 01:35

## 2018-05-28 RX ADMIN — METOPROLOL TARTRATE 5 MG: 5 INJECTION, SOLUTION INTRAVENOUS at 12:38

## 2018-05-28 RX ADMIN — HUMAN ALBUMIN MICROSPHERES AND PERFLUTREN 3 ML: 10; .22 INJECTION, SOLUTION INTRAVENOUS at 09:15

## 2018-05-28 RX ADMIN — ENOXAPARIN SODIUM 40 MG: 40 INJECTION SUBCUTANEOUS at 08:12

## 2018-05-28 RX ADMIN — I.V. FAT EMULSION 250 ML: 20 EMULSION INTRAVENOUS at 20:15

## 2018-05-28 RX ADMIN — SODIUM CHLORIDE 500 ML: 9 INJECTION, SOLUTION INTRAVENOUS at 23:35

## 2018-05-28 RX ADMIN — ASCORBIC ACID, VITAMIN A PALMITATE, CHOLECALCIFEROL, THIAMINE HYDROCHLORIDE, RIBOFLAVIN-5 PHOSPHATE SODIUM, PYRIDOXINE HYDROCHLORIDE, NIACINAMIDE, DEXPANTHENOL, ALPHA-TOCOPHEROL ACETATE, VITAMIN K1, FOLIC ACID, BIOTIN, CYANOCOBALAMIN: 200; 3300; 200; 6; 3.6; 6; 40; 15; 10; 150; 600; 60; 5 INJECTION, SOLUTION INTRAVENOUS at 20:16

## 2018-05-28 RX ADMIN — SIMETHICONE 133 MG: 20 EMULSION ORAL at 17:20

## 2018-05-28 RX ADMIN — METOPROLOL TARTRATE 5 MG: 5 INJECTION, SOLUTION INTRAVENOUS at 06:04

## 2018-05-28 RX ADMIN — SODIUM CHLORIDE 500 ML: 9 INJECTION, SOLUTION INTRAVENOUS at 14:14

## 2018-05-28 NOTE — CONSULTS
[facility]    Cardiology Consultation     Date of Admission:  5/23/2018    Assessment & Plan   Hussein Hayes is a 82 year old male who was admitted on 5/23/2018. I was asked to see the patient for troponin elevation in setting of POD 4 s/p ex lap and afib w rVR    Afib was controlled with metoprolol IV by team and is rate controlled at this time.     Echo from 5/23 showed EF 35-40%, with large area of septal and anteroseptal near akinesis. Can consider repeat of echo to look for differences. Etiology is unclear. May reqiure furhter work up in outpatient setting but with ongoing acute issues would not     1) Afib w RVR, resolved, in sinus rhythm  2) Troponin elevation, likely Type 2 NSTEMI  3) POD 4 s/p ex lab CARLOS and gastrojejunostomy 2/2 metastaitc adenocarcinoma of small intestine  4) BAKARI      DIscussed with hospitalist. At this time given postop state and aggressive malignancy with poor prognosis would defer further aggressive management especially as this likely represents demand ischemia. Recommend echocardiogram to be repeated at some point, especially when rates improve.  Heparin not necessary at this time weighing risks and benefits. Troponin is downtrending.    -metoprolol 50mg po BID when tolerating orals  -metoprolol 5 mg IV q6 hours for now, continue  -can consider repeat echo.     Will sign off at this point. We do not believe there is any indication for acute management of Mr. Hayes's troponin elevation given ongoing multiple medical issues, postoperative state and lack of symptoms, and downtrending troponin. This is likely due to demand from the afib w rvr, which is now resolve.d     Will sign off. Contact w questions. Discussed w Dr. Nathaniel Kevin MD    Code Status    Full Code    Reason for Consult   Reason for consult: I was asked to evaluate this patient for troponin elevation    Primary Care Physician   Kenneth G. Pallas    Chief Complaint   Troponin  elevation    History is obtained from the patient    History of Present Illness   Hussein Hayes is a 82 year old male who is post op team #4 status post exploratory laparotomy, lysis of adhesions, mesenteric biopsy, gastro-jejunostomy due to metastatic adenocarcinoma of small intestine.  Consulted by general surgery as above due to new onset A. fib today.  Per discussion with nurse and review of notes patient noted to have in a regular rhythm on exam therefore he was placed on telemetry and found to have normal sinus rhythm with PVCs.  HR ranging 90s-130s. SBP 120s-130s. An EKG was done showing atrial fibrillation with a rate of 98 bpm, flattening of T waves in anterior leads, and QTc 400 ms.  Preliminary workup started by surgery.  Noted to have magnesium 1.3.  And troponin I 1.196. Breath sounds diminished with bilateral feet and ankle swelling 1-2+ pitting edema and pt ~4 lbs up therefore given Lasix 10 mg IV x1. Cr also bumped up today to 1.41 from 1.14 yesterday. No hx renal dysfunction.      During my visit patient feeling well without acute changes since surgery.  He denies shortness of breath, heart palpitations, or chest discomfort.    Developed episode of afib which responded to 5 mg IV of metoprolol. Denies active chest pain. Afib w RVR recured however.         Past Medical History   I have reviewed this patient's medical history and updated it with pertinent information if needed.   Past Medical History:   Diagnosis Date     BPH (benign prostatic hyperplasia)      COPD (chronic obstructive pulmonary disease) (H)      Hypertension     No cardiologist     Malignant neoplasm of small intestine (H)        Past Surgical History   I have reviewed this patient's surgical history and updated it with pertinent information if needed.  Past Surgical History:   Procedure Laterality Date     APPENDECTOMY       CATARACT IOL, RT/LT Bilateral      ESOPHAGOSCOPY, GASTROSCOPY, DUODENOSCOPY (EGD), COMBINED N/A  5/4/2018    Procedure: COMBINED ESOPHAGOSCOPY, GASTROSCOPY, DUODENOSCOPY (EGD);  Esophagoscopy, gastroscopy, duodenoscopy and duodenal mass biopsies ;  Surgeon: Randall Wheatley MD;  Location: [facility] OR     GASTROJEJUNOSTOMY N/A 5/23/2018    Procedure: GASTROJEJUNOSTOMY;;  Surgeon: Kenneth Berg MD;  Location: [facility] OR     LAPAROTOMY EXPLORATORY N/A 5/23/2018    Procedure: LAPAROTOMY EXPLORATORY;  ABDOMINAL EXPLORATION.  MESSENTARY BIOSPY.  GASTROJEJUNOSTOMY;  Surgeon: Kenneth Berg MD;  Location:  OR       Prior to Admission Medications   Prior to Admission Medications   Prescriptions Last Dose Informant Patient Reported? Taking?   lisinopril-hydrochlorothiazide (PRINZIDE/ZESTORETIC) 20-25 MG per tablet 5/23/2018 at 0600 Self Yes Yes   Sig: Take 1 tablet by mouth daily   umeclidinium-vilanterol (ANORO ELLIPTA) 62.5-25 MCG/INH oral inhaler 5/21/2018 at prn Self Yes Yes   Sig: Inhale 1 puff into the lungs daily as needed       Facility-Administered Medications: None     Allergies   Allergies   Allergen Reactions     Penicillins Hives       Social History   I have reviewed this patient's social history and updated it with pertinent information if needed. Hussein Hayes  reports that he has quit smoking. His smoking use included Cigarettes. He has a 40.00 pack-year smoking history. He has never used smokeless tobacco. He reports that he drinks alcohol. He reports that he does not use illicit drugs.    Family History   I have reviewed this patient's family history and updated it with pertinent information if needed.   History reviewed. No pertinent family history.    Review of Systems   The 10 point Review of Systems is negative other than noted in the HPI or here.     Physical Exam   Temp: 97.5  F (36.4  C) Temp src: Axillary BP: 129/90 Pulse: 138 (HR on Monitor up to 138 with PAC and PVC's- MD updated) Heart Rate: 102 Resp: 18 SpO2: 93 % O2 Device: None (Room air)    Vital Signs with Ranges  Temp:  [97.5  F (36.4   C)-97.8  F (36.6  C)] 97.5  F (36.4  C)  Pulse:  [] 138  Heart Rate:  [] 102  Resp:  [16-18] 18  BP: ()/(54-92) 129/90  SpO2:  [93 %-96 %] 93 %  123 lbs 14.38 oz      Data   Results for orders placed or performed during the hospital encounter of 05/23/18 (from the past 24 hour(s))   XR Abdomen 1 View    Narrative    ABDOMEN ONE VIEW  5/27/2018 11:48 AM     HISTORY: Follow up post op ileus.     COMPARISON: None.      Impression    IMPRESSION: Scattered areas of intermediate and high density are seen  scattered throughout the abdomen which are presumably all related to  contrast material. There is a nasogastric tube with its tip projecting  in the stomach. There is some free air in the region of the biliary  tree and in the region of the subhepatic space but this is all  presumably postoperative. Bowel gas pattern is difficult to evaluate  due to the different densities of contrast material. There is no  evidence for colonic obstruction. Small bowel loops appear to be  minimally prominent.    TRA VERDUZCO MD   Basic metabolic panel   Result Value Ref Range    Sodium 142 133 - 144 mmol/L    Potassium 4.1 3.4 - 5.3 mmol/L    Chloride 106 94 - 109 mmol/L    Carbon Dioxide 26 20 - 32 mmol/L    Anion Gap 10 3 - 14 mmol/L    Glucose 130 (H) 70 - 99 mg/dL    Urea Nitrogen 28 7 - 30 mg/dL    Creatinine 1.41 (H) 0.66 - 1.25 mg/dL    GFR Estimate 48 (L) >60 mL/min/1.7m2    GFR Estimate If Black 58 (L) >60 mL/min/1.7m2    Calcium 9.2 8.5 - 10.1 mg/dL   Magnesium   Result Value Ref Range    Magnesium 1.3 (L) 1.6 - 2.3 mg/dL   Phosphorus   Result Value Ref Range    Phosphorus 3.3 2.5 - 4.5 mg/dL   Troponin I   Result Value Ref Range    Troponin I ES 1.196 (HH) 0.000 - 0.045 ug/L   CBC with platelets   Result Value Ref Range    WBC 12.7 (H) 4.0 - 11.0 10e9/L    RBC Count 4.20 (L) 4.4 - 5.9 10e12/L    Hemoglobin 12.7 (L) 13.3 - 17.7 g/dL    Hematocrit 38.1 (L) 40.0 - 53.0 %    MCV 91 78 - 100 fl    MCH 30.2 26.5 -  33.0 pg    MCHC 33.3 31.5 - 36.5 g/dL    RDW 13.3 10.0 - 15.0 %    Platelet Count 288 150 - 450 10e9/L   EKG 12-lead, tracing only   Result Value Ref Range    Interpretation ECG Click View Image link to view waveform and result    Hospitalist IP Consult: Patient to be seen: STAT - within 1 hour; Call back #: [phone]; Afib; Consultant may enter orders: Yes    Narrative    Gabrielle lAvarado PA-C     5/27/2018  9:02 PM  [facility]    Hospitalist Consultation    Date of Admission:  5/23/2018    Assessment & Plan   Hussein Hayes is a 82 year old male who was admitted on   5/23/2018. I was asked to see the patient as a stat consult for   Afib.    # New Onset Afib with troponin leak likely demand ischemia?: EKG   showing Afib with rate 98 bpm. Currently asymptomatic. Denies   chest pains, SOB, or heart palpitations.  Does endorse heartburn   as usual however daughter states that she feels that it is worse   today.  No hx per pt but does have a family hx including in his   father. [id number]: 3 for male, >74 yo, and HTN. Lactate 1.3. HR   90s-130s, highest 150 on tele.  -Cardiology consult ASAP, paged fellow and awaiting return page:   question whether pt needs cath lab and to start heparin; ok to   start heparin drip if needed per general surgery Dr. Berg but   recommending to avoid heparin bolus  -IV Metoprolol 5mg now and Q 6 hrs with hold parameters  -Mg++ 1.3: Replete with magnesium sulfate 4gm   -Aspirin 300 mg rectally ×1 - discussed with surgeon Dr. Berg,   okay to give rectally but likely unable to absorb via p.o.  -Trend troponins Q 4 x3, next trop at 1900 read as 1.125  -Telemetry  -Echocardiogram  -CXR for decreased breath sounds to rule out flash pulmonary   edema  -CBC unremarkable, mild leukocytosis c/w prior post op, mild   anemia improved  -Lipid panel  -[id number] score 3 c/w 3.2% risk  -If remains in Afib x48 hrs and not converted will need AC    Addendum: Spoke  with cardiology fellow and reviewed case. Will   hold heparin drip at this time as the benefits may outweigh the   risks in this postop day # 4 patient with metastatic intestinal   adenocarcinoma. Plan for ECHO in AM. Rate control with IV   metoprolol. Cardiology to complete formal consult in AM. CXR   without acute changes.    # Acute Kidney Injury: Cr bumped up 1.41, up from 1.14 yesterday.   Up ~ 4lbs since yesterday. Bilateral foot/ankle swelling on exam.   No SOB. Cosme in place. Given Lasix 10 mg x1. Noted Hx BPH.   Surgical plans to start flomax and remove cosme when able.  -Avoid NSAIDs after aspirin if able  -I & Os  -Recheck BMP in AM, monitor and may need Nephrology vs. Urology   consult  -Monitor UOP, pull cosme when able    # POD #4 s/p exp lap, CARLOS, and gastro-jejunostomy 2/2 metastatic   adenocardinoma of small intestine:   -Postop cares per general surgery    # Hx HTN: SBP 120s-130s. Takes Lisinopril/HCTZ 20/25 mg at home   PTA. Has been held due to NPO.  -Telemetry    # Hx COPD:   -Titrate O2 PRN  -Continue PTA Anoro Ellipta  -Xopenex nebs PRN (rather than albuterol due to avoiding   tachycardia)    DVT Prophylaxis: Enoxaparin (Lovenox) SQ and Pneumatic   Compression Devices    Code Status: Full Code    Disposition: Expected discharge per primary team General Surgery    This patient was discussed with Dr. Paco Vegas of the   Hospitalist Service who agrees with current plans as outlined   above.    Gabrielle Alvarado PA-C  Hospitalist Physician Assistant  Pager: [phone]      Reason for Consult   Reason for consult: I was asked by Surgery to evaluate this   patient for new onset AFib.    Primary Care Physician   Kenneth G. Pallas    Chief Complaint   New onset Afib    History is obtained from the patient and electronic health record    History of Present Illness   Hussein Hayes is a 82 year old male who is post op team   #4 status post exploratory laparotomy, lysis of adhesions,    mesenteric biopsy, gastro-jejunostomy due to metastatic   adenocarcinoma of small intestine.  Consulted by general surgery   as above due to new onset A. fib today.  Per discussion with   nurse and review of notes patient noted to have in a regular   rhythm on exam therefore he was placed on telemetry and found to   have normal sinus rhythm with PVCs.  HR ranging 90s-130s. SBP   120s-130s. An EKG was done showing atrial fibrillation with a   rate of 98 bpm, flattening of T waves in anterior leads, and QTc   400 ms.  Preliminary workup started by surgery.  Noted to have   magnesium 1.3.  And troponin I 1.196. Breath sounds diminished   with bilateral feet and ankle swelling 1-2+ pitting edema and pt   ~4 lbs up therefore given Lasix 10 mg IV x1. Cr also bumped up   today to 1.41 from 1.14 yesterday. No hx renal dysfunction.     During my visit patient feeling well without acute changes since   surgery.  He denies shortness of breath, heart palpitations, or   chest discomfort.  He does note a feeling of heartburn which he   is gotten persistently and chronically.  Daughter does report she   feels heartburn has been worse today.  NG tube in place during   exam.    Past Medical History    I have reviewed this patient's medical history and updated it   with pertinent information if needed.   Past Medical History:   Diagnosis Date     BPH (benign prostatic hyperplasia)      COPD (chronic obstructive pulmonary disease) (H)      Hypertension     No cardiologist     Malignant neoplasm of small intestine (H)        Past Surgical History   I have reviewed this patient's surgical history and updated it   with pertinent information if needed.  Past Surgical History:   Procedure Laterality Date     APPENDECTOMY       CATARACT IOL, RT/LT Bilateral      ESOPHAGOSCOPY, GASTROSCOPY, DUODENOSCOPY (EGD), COMBINED N/A   5/4/2018    Procedure: COMBINED ESOPHAGOSCOPY, GASTROSCOPY, DUODENOSCOPY   (EGD);  Esophagoscopy, gastroscopy,  duodenoscopy and duodenal   mass biopsies ;  Surgeon: Randall Wheatley MD;  Location: [facility] OR     GASTROJEJUNOSTOMY N/A 5/23/2018    Procedure: GASTROJEJUNOSTOMY;;  Surgeon: Kenneth Berg MD;    Location: [facility] OR     LAPAROTOMY EXPLORATORY N/A 5/23/2018    Procedure: LAPAROTOMY EXPLORATORY;  ABDOMINAL EXPLORATION.    MESSENTARY BIOSPY.  GASTROJEJUNOSTOMY;  Surgeon: Kenneth Berg MD;  Location:  OR       Prior to Admission Medications   Prior to Admission Medications   Prescriptions Last Dose Informant Patient Reported? Taking?   lisinopril-hydrochlorothiazide (PRINZIDE/ZESTORETIC) 20-25 MG per   tablet 5/23/2018 at 0600 Self Yes Yes   Sig: Take 1 tablet by mouth daily   umeclidinium-vilanterol (ANORO ELLIPTA) 62.5-25 MCG/INH oral   inhaler 5/21/2018 at prn Self Yes Yes   Sig: Inhale 1 puff into the lungs daily as needed       Facility-Administered Medications: None     Allergies   Allergies   Allergen Reactions     Penicillins Hives       Social History   I have reviewed this patient's social history and updated it with   pertinent information if needed. Hussein Hayes  reports   that he has quit smoking. His smoking use included Cigarettes. He   has a 40.00 pack-year smoking history. He has never used   smokeless tobacco. He reports that he drinks alcohol. He reports   that he does not use illicit drugs.    Family History   Pt reports Afib in father and aunt. Father also had 3vCABG around   85-91 yo.    Review of Systems   The 10 point Review of Systems is negative other than noted in   the HPI.     Physical Exam   Temp: 97.5  F (36.4  C) Temp src: Axillary BP: 129/90 Pulse: 138   (HR on Monitor up to 138 with PAC and PVC's- MD updated) Heart   Rate: 102 Resp: 18 SpO2: 93 % O2 Device: None (Room air)    Vital Signs with Ranges  Temp:  [97.5  F (36.4  C)-97.8  F (36.6  C)] 97.5  F (36.4  C)  Pulse:  [] 138  Heart Rate:  [] 102  Resp:  [16-18] 18  BP: ()/(54-92) 129/90  SpO2:  [93 %-96 %]  93 %  123 lbs 14.38 oz    Constitutional: Awake, alert, cooperative, no apparent distress.  Eyes: Conjunctiva and pupils examined and normal. Lt pupil with   deformity and sluggish due to prior retinal detachment.  HEENT: Moist mucous membranes, normal dentition.  Respiratory: Decreased breath sounds bilaterally, no crackles or   wheezing. Exam limited by difficulty sitting forward due to abd   binder.   Cardiovascular: Irregular rate and rhythm, normal S1 and S2, and   no murmur noted.  GI: Soft but mildy firm/distended, no guarding, non-tender,   normal bowel sounds. NGT to suction of dark liquid. Abd binder in   place at upper fields, limited assessment.  Skin: No rashes, no cyanosis. Bilateral foot/ankle 1-2+ pitting   edema.   Musculoskeletal: No joint swelling, erythema or tenderness.  Neurologic: Cranial nerves 2-12 intact, normal strength and   sensation.  Psychiatric: Alert, oriented to person, place and time, no   obvious anxiety or depression.    Data   -Data reviewed today: All pertinent laboratory and imaging   results from this encounter were reviewed. I personally reviewed   the EKG tracing showing AFib with flattening of anterior leads,   98 bpm, and QTc 400 ms..    Recent Labs  Lab 05/27/18  1800 05/27/18  1500 05/26/18  0640 05/24/18  0715 05/23/18  0859   WBC  --  12.7* 11.1* 15.5*  --    HGB  --  12.7* 11.5* 12.7* 13.2*   MCV  --  91 89 87  --    PLT  --  288 238 240 267   NA  --  142  --  130*  --    POTASSIUM  --  4.1  --  4.1 3.8   CHLORIDE  --  106  --  97  --    CO2  --  26  --  21  --    BUN  --  28  --  20  --    CR  --  1.41* 1.14 1.19 1.29*   ANIONGAP  --  10  --  12  --    LUBNA  --  9.2  --  8.5  --    GLC  --  130*  --  110*  --    TROPI 1.125* 1.196*  --   --   --        Imaging:  Recent Results (from the past 24 hour(s))   XR Abdomen 1 View    Narrative    ABDOMEN ONE VIEW  5/27/2018 11:48 AM     HISTORY: Follow up post op ileus.     COMPARISON: None.      Impression    IMPRESSION:  Scattered areas of intermediate and high density are   seen  scattered throughout the abdomen which are presumably all related   to  contrast material. There is a nasogastric tube with its tip   projecting  in the stomach. There is some free air in the region of the   biliary  tree and in the region of the subhepatic space but this is all  presumably postoperative. Bowel gas pattern is difficult to   evaluate  due to the different densities of contrast material. There is no  evidence for colonic obstruction. Small bowel loops appear to be  minimally prominent.    TRA VERDUZCO MD   XR Chest 2 Views    Narrative    CHEST TWO VIEWS    5/27/2018 7:42 PM     COMPARISON: None.    HISTORY: Question fluid overload.      FINDINGS: There are emphysematous and fibrotic changes throughout   both  lungs. There are no airspace opacities to suggest pneumonia.   There is  no pleural effusion or pneumothorax. Heart size is normal.     FLO RESENDEZ MD              Troponin I   Result Value Ref Range    Troponin I ES 1.125 (HH) 0.000 - 0.045 ug/L   Lactic acid level STAT for sepsis protocol   Result Value Ref Range    Lactate for Sepsis Protocol 1.3 0.4 - 1.9 mmol/L   XR Chest 2 Views    Narrative    CHEST TWO VIEWS    5/27/2018 7:42 PM     COMPARISON: None.    HISTORY: Question fluid overload.      FINDINGS: There are emphysematous and fibrotic changes throughout both  lungs. There are no airspace opacities to suggest pneumonia. There is  no pleural effusion or pneumothorax. Heart size is normal.     FLO RESENDEZ MD         Discussed with Dr. Armstrong.

## 2018-05-28 NOTE — PROGRESS NOTES
"[facility]  GENERAL SURGERY Progress Note    Admission Date: 2018         Assessment and Plan:   Hussein Hayes is a 82 year old male S/P Procedure(s):  LAPAROTOMY EXPLORATORY  GASTROJEJUNOSTOMY, 5 Days Post-Op.    Continued high NGT output and per nursing becomes nauseous when clamped. Gastrografin study done yesterday with evidence of contrast within the bowel. Is having bowel movements but still distended.       New onset afib RVR, converted with metoprolol. Hospitalist consulted with elevated trops, likely demand ischemia associated with event, however cards consulted and ECHO ordered to rule out any cardiac event.     - would keep NGT to LIS for today, continue clear liquid diet for comfort.   - PICC line to be placed today, start TPN  - Continue PCA for pain control.   - SCD/ Lovenox  - Continue cosme, will start flomax when able to take po and try another trial of void  - Replacement protocol for electrolytes.   - Encourage ambulation and IS             Interval History:   Withdrawn this morning, discouraged that he can't eat more. Pain well controlled. Did have some nausea with clamping yesterday and continues to feel distended. Started to have some loose stools yesterday.                       Physical Exam:   Blood pressure 128/80, pulse 138, temperature 97.4  F (36.3  C), temperature source Oral, resp. rate 16, height 1.702 m (5' 7\"), weight 55.4 kg (122 lb 2.2 oz), SpO2 95 %.  Temperature Temp  Av  F (36.7  C)  Min: 97.6  F (36.4  C)  Max: 98.7  F (37.1  C)   I/O last 3 completed shifts:  In: 3693 [P.O.:200; I.V.:3493]  Out: 2325 [Urine:1125; Emesis/NG output:1200]  Constitutional:  Awake, alert, oriented, and in no apparent distress.   Lungs: No increased work of breathing, good air exchange, clear to auscultation bilaterally, and no crackles or wheezing.   Cardiovascular: Regular rate and rhythm, normal S1 and S2, and no murmur noted.   Abdomen: Soft, mild " distended, non tender, binder in place. , steri strips in place with no concern for infection.    Wound(s): Clean, dry, and intact. No erythema or drainage.    Extremities: No edema or calf tenderness. +SCDs          Data:     Recent Labs   Lab Test  05/27/18 1500 05/26/18   0640  05/24/18 0715   WBC  12.7*  11.1*  15.5*   HGB  12.7*  11.5*  12.7*   HCT  38.1*  34.6*  36.5*   PLT  288  238  240      Recent Labs   Lab Test  05/27/18 1500 05/26/18   0640  05/24/18 0715  05/23/18   0859   NA  142   --   130*   --    POTASSIUM  4.1   --   4.1  3.8   CHLORIDE  106   --   97   --    CO2  26   --   21   --    BUN  28   --   20   --    CR  1.41*  1.14  1.19  1.29*     No lab results found.   No lab results found.  Recent Labs   Lab Test  05/27/18   2245  05/27/18   1500  05/24/18   0715   LUBNA   --   9.2  8.5   PHOS   --   3.3   --    MAG  2.5*  1.3*   --      Recent Labs   Lab Test  05/27/18   1500  05/24/18   0715   ANIONGAP  10  12       Farrah Leos MD- General Surgery Resident.   [facility]  [phone]

## 2018-05-28 NOTE — PLAN OF CARE
Problem: Patient Care Overview  Goal: Plan of Care/Patient Progress Review  Outcome: Improving  VSS, A&O, Lung sounds diminished, NG output 100ml- dark brown drainage. Bowel sounds hyperactive in the LLQ, stool x2- loose per patient. Passing gas. Abdominal incision ROCIO with steri strips. Abdominal binder intact. Has PCA Dilaudid for pain control but did not use. 2+ edema in bilateral ankles. AUNDREA from cosme catheter- Given 500 ml Bolus of NS over 3 hours per MD orders. Patient Amnio gtt was d/c by Cardiology as patient is now currently in SR in the 70's. Cardiology signed off. ECHO completed. Picc line inserted by IV team in the right arm. Will start TPN and lips this alise. Patient and family updated. Ambulating the hallway with SBA.

## 2018-05-28 NOTE — CONSULTS
CLINICAL NUTRITION SERVICES - REASSESSMENT NOTE      Recommendations Ordered by Registered Dietitian (RD):   TPN @ goal of 80 mL/hr + 250 mL lipids daily = 1863 aniceto (34 aniceto/kg), 96 gm pro (1.8 gm/kg), 288 gm CHO, 27% lipids.    Pt may be at refeeding risk, will start @ 30 mL/hr, after 24 hrs advance to 50 mL/hr, after 48 hrs advance to goal of 80 mL/hr.     Malnutrition (dx'd 5/24):   % Weight Loss:  Up to 7.5% in 3 months (non-severe malnutrition)  % Intake:  <75% for >/= 3 months (non-severe malnutrition)  Subcutaneous Fat Loss:  None observed  Muscle Loss:  None observed  Fluid Retention:  None noted     Malnutrition Diagnosis: Non-Severe malnutrition  In Context of:  Acute illness or injury        EVALUATION OF PROGRESS TOWARD GOALS   Diet:  Clear liquids, with NG to LIS  Intake:  Per chart notes, pt unable to tolerate NG clamping, he becomes nauseous. Plan to place PICC line and start TPN      ASSESSED NUTRITION NEEDS:  Dosing Weight 54.3 kg - 5/24  Estimated Energy Needs: 4561-4376 kcals (30-35 Kcal/Kg)  Justification: underweight  Estimated Protein Needs:  grams protein (1.5-2 g pro/Kg)  Justification: Repletion    NEW FINDINGS:   NS IVF @ 125 mL/hr  , 127  K, Mag, Phos all WNL    Vitals:    05/23/18 0855 05/24/18 0434 05/25/18 0553 05/26/18 0640   Weight: 53.5 kg (118 lb) 54.3 kg (119 lb 11.4 oz) 52.1 kg (114 lb 13.8 oz) 54.6 kg (120 lb 5.9 oz)    05/27/18 0650 05/28/18 0606   Weight: 56.2 kg (123 lb 14.4 oz) 55.4 kg (122 lb 2.2 oz)       Previous Goals:   Pt will advance to solids in 2-3 days.  Evaluation: Not met    Previous Nutrition Diagnosis:   Inadequate protein-energy intake related to altered GI function as evidenced by report of minimal oral intake and 5% wt loss x 3 months  Evaluation: No change      CURRENT NUTRITION DIAGNOSIS  Same: Inadequate protein-energy intake related to altered GI function as evidenced by report of minimal oral intake and 5% wt loss x 3  months    INTERVENTIONS  Recommendations / Nutrition Prescription  TPN @ goal of 80 mL/hr + 250 mL lipids daily = 1863 aniceto (34 aniceto/kg), 96 gm pro (1.8 gm/kg), 288 gm CHO, 27% lipids.    Pt may be at refeeding risk, will start @ 30 mL/hr, after 24 hrs advance to 50 mL/hr, after 48 hrs advance to goal of 80 mL/hr.    Implementation  PN Composition and PN Schedule - ordered the above    Goals  TPN will reach goal rate of 80 mL/hr within the next 72  hrs      MONITORING AND EVALUATION:  Progress towards goals will be monitored and evaluated per protocol and Practice Guidelines    Keli Guerra, RICK  Pager [phone] (M-F)            [phone] ([facility] & Hol),

## 2018-05-28 NOTE — PLAN OF CARE
Problem: Patient Care Overview  Goal: Plan of Care/Patient Progress Review  Outcome: No Change  Pt is A/Ox4. VSS on RA.  Tele- NSR, but newly diagnosed with  A-fib w/ RVR,  pt IV metoprolol every 6 hours. Troponin trending down, last result 0.759.  Abd incision, w/ steri-strips, abd binder in place. Bowel sounds hypoactive not passing gas.  NGT low intermittent suction  with drainage brownish/green in color. Lung sounds diminished.  Araya patent, w/ adequate UOP. Denies pain, PCA dilaudid available, pt not using it. Mag replaced, rechecked,2.5. Recheck again today. Up with assist of 1. Will continue to observe.

## 2018-05-28 NOTE — PROVIDER NOTIFICATION
Call placed to Dr. Lopez regarding low urine output of 150ml    -- Given 500 ML NS bolus over 3 hours.

## 2018-05-28 NOTE — PROGRESS NOTES
[facility]    Hospitalist Progress Note    Assessment & Plan   Hussein Hayes is a 82 year old male who was admitted on 5/23/2018.  Hospitalist following the patient for elevated troponin and A. fib with RVR     Duodenal adenocarcinoma:  Postoperative ileus  -Status post exploratory laparotomy with mesenteric nodule biopsy and gastrojejunostomy with lysis of adhesions   -Patient having high outputs with NG tube, and nausea persisting, continue with NG tube for low intermittent suctioning  -Pain controlled with PCA.  -Once wound healed and after surgical recovery will need to follow-up with oncology as an outpatient    New Onset Afib with RVR  -likely in the setting of acute illness and electrolyte abnormality, patient has converted back to sinus rhythm.    Continue IV metoprolol as the patient is currently n.p.o. and not tolerating oral intake.  -Continue to replete electrolytes, echocardiogram with low EF and wall motion abnormality  -Cardiology following , continue to monitor in telemetry    Troponin leak likely demand ischemia  Cardiomyopathy-?  Tachycardia induced versus ischemic:  -Patient currently denies any chest pain, shortness of breath, troponin with decreasing trend  -Echocardiogram however revealed large area of septal and anteroseptal near akinesis, with a EF of 35-40%, no known cardiac history   -cardiology followed patient, now signed off  -Given acute surgical issues and possibility of malignancy did not recommend any acute cardiac intervention and possibility of outpatient workup and a repeat echo for comparison     Nonsevere malnutrition  -In the setting of weight loss up to 7.5% in the last 3 month with intake less than 75% in the last 3 months in the context of acute illness or injury  -Due to ileus patient started on TPN, dietitian following    Acute Kidney Injury:   Cr bumped up 1.41,But improving today to 1.23.  Urine output decreasing, was given 1 dose of IV  Lasix  -Given weight gain and decreased EF on echo would be cautious with fluid.  Will hold off on lisinopril HCTZ to allow room for further diuresis if needed, given soft blood pressure and patient is also n.p.o.  -Avoid NSAIDs and any other nephrotoxins  -Strict I & Os      Hx HTN:  - Takes Lisinopril/HCTZ 20/25 mg  PTA. Started on metoprolol for A. fib with RVR.  Continue IV metoprolol.  Continue to hold lisinopril HCTZ given soft blood pressure and slightly worsened renal function along with n.p.o. Status    Hx COPD:   -Titrate O2 PRN  -Continue PTA Anoro Ellipta  -Xopenex nebs PRN (rather than albuterol due to avoiding tachycardia)    Pain assessment :  Current Pain Score 5/28/2018   Patient currently in pain? -   Pain score (0-10) 0   Pain location -   - Hussein is experiencing pain due to recent surgery. Pain management was discussed and the plan was created in a collaborative fashion.  Hussein's response to the current recommendations: engaged  -Patient has PCA Dilaudid available however not using it.  Primary/surgery managing pain    DVT Prophylaxis: Enoxaparin (Lovenox) SQ  Code Status: Full Code    Disposition: Expected discharge per primary    Adina Ybarra MD  Text page (7am - 6pm)    Interval History   Patient denies any chest pain, shortness of breath, dizziness, lightheadedness.  Reports pain especially when coughing on the surgical area.  No new nursing concerns.  Patient converted back to sinus rhythm    -Data reviewed today: I reviewed all new labs and imaging results over the last 24 hours. I personally reviewed no images or EKG's today.    Physical Exam   Temp: 97.5  F (36.4  C) Temp src: Oral BP: 115/77 Pulse: 138 (HR on Monitor up to 138 with PAC and PVC's- MD updated) Heart Rate: 69 Resp: 16 SpO2: 95 % O2 Device: None (Room air)    Vitals:    05/26/18 0640 05/27/18 0650 05/28/18 0606   Weight: 54.6 kg (120 lb 5.9 oz) 56.2 kg (123 lb 14.4 oz) 55.4 kg (122 lb 2.2 oz)     Vital Signs with  Ranges  Temp:  [97.4  F (36.3  C)-97.8  F (36.6  C)] 97.5  F (36.4  C)  Pulse:  [] 138  Heart Rate:  [] 69  Resp:  [16-18] 16  BP: (115-137)/(77-90) 115/77  SpO2:  [93 %-96 %] 95 %  I/O last 3 completed shifts:  In: 3693 [P.O.:200; I.V.:3493]  Out: 2325 [Urine:1125; Emesis/NG output:1200]    Exam:  Constitutional: Awake, alert and no distress. Appears comfortable  Head: Normocephalic. No masses, lesions, tenderness or abnormalities  ENT: no neck nodes or sinus tenderness  Cardiovascular: RRR.  2+ murmurs, no rub or gallop no JVD  Respiratory: Normal WOB, no wheezes or crackles   Gastrointestinal: Abdomen soft, tender around surgical incision.  Abdominal binder in place. BS hypoactive . No masses, organomegaly  : Deferred  Extremities: 1-2+ edema , no clubbing /cyanosis   Skin: Warm and dry, no rash        Medications     0.9% sodium chloride + KCl 20 mEq/L 125 mL/hr at 05/28/18 0136     HYDROmorphone         enoxaparin  40 mg Subcutaneous Q24H     lisinopril-hydrochlorothiazide  1 tablet Oral Daily     metoprolol  5 mg Intravenous Q6H     pantoprazole (PROTONIX) IV  40 mg Intravenous Daily with breakfast     [COMPLETED] perflutren diluted 1mL to 2mL with saline  3 mL Intravenous Once     senna-docusate  1 tablet Oral BID    Or     senna-docusate  2 tablet Oral BID     simethicone  133 mg Oral 4x Daily     sodium chloride (PF)  10 mL Intracatheter Q8H     sodium chloride (PF)  3 mL Intracatheter Q8H       Data     Recent Labs  Lab 05/28/18  0720 05/28/18  0250 05/27/18  2245 05/27/18  1800 05/27/18  1500  05/26/18  0640 05/24/18  0715   WBC  --   --   --   --  12.7*  --  11.1* 15.5*   HGB  --   --   --   --  12.7*  --  11.5* 12.7*   MCV  --   --   --   --  91  --  89 87   PLT  --   --   --   --  288  --  238 240     --   --   --  142  --   --  130*   POTASSIUM 3.8  --   --   --  4.1  --   --  4.1   CHLORIDE 105  --   --   --  106  --   --  97   CO2 25  --   --   --  26  --   --  21   BUN 29  --    --   --  28  --   --  20   CR 1.23  --   --   --  1.41*  --  1.14 1.19   ANIONGAP 10  --   --   --  10  --   --  12   LUBNA 8.7  --   --   --  9.2  --   --  8.5   *  --   --   --  130*  --   --  110*   TROPI  --  0.759* 0.995* 1.125* 1.196*  < >  --   --    < > = values in this interval not displayed.    Imaging:  Recent Results (from the past 24 hour(s))   XR Abdomen 1 View    Narrative    ABDOMEN ONE VIEW  5/27/2018 11:48 AM     HISTORY: Follow up post op ileus.     COMPARISON: None.      Impression    IMPRESSION: Scattered areas of intermediate and high density are seen  scattered throughout the abdomen which are presumably all related to  contrast material. There is a nasogastric tube with its tip projecting  in the stomach. There is some free air in the region of the biliary  tree and in the region of the subhepatic space but this is all  presumably postoperative. Bowel gas pattern is difficult to evaluate  due to the different densities of contrast material. There is no  evidence for colonic obstruction. Small bowel loops appear to be  minimally prominent.    TRA VERDUZCO MD   XR Chest 2 Views    Narrative    CHEST TWO VIEWS    5/27/2018 7:42 PM     COMPARISON: None.    HISTORY: Question fluid overload.      FINDINGS: There are emphysematous and fibrotic changes throughout both  lungs. There are no airspace opacities to suggest pneumonia. There is  no pleural effusion or pneumothorax. Heart size is normal.     FLO RESENDEZ MD

## 2018-05-28 NOTE — PROGRESS NOTES
Oncology/Hematology Chart Check:     Reviewed lab results and notes from surgery/cardiology.    Noted development of A fib with RVR, with possible demand ischemia and EF of 35-40%.  He is now on amiodarone gtt for management of A fib with RVR.  Troponin is now trending down.  He is hemodynamically stable.     - No new recommendations at this time  - Needs to heal and recover from surgery before considering chemotherapy.  - Karlos should see Dr. Siddiqi in 2-3 weeks after discharge to discuss chemotherapy  - We will continue to follow along peripherally     Derrick Barlow MD  [facility]

## 2018-05-28 NOTE — PLAN OF CARE
Problem: Patient Care Overview  Goal: Plan of Care/Patient Progress Review  Outcome: No Change  A/OX4. New A-fib w/ MD VIRDIIANA aware, assessed pt and  started on metoprolol. Troponin trending down, Aspirin given rectally as ordered. Abd incision, w/ steri-strips, Hypo BS, not passing gas, abd binder in place, watery BMX1. NGT TO LIS. Araya patent, w/ adequate UOP, lasix given early for low UOP. Chest xray done to r/o fluid overload.Denies pain, PCA dilaudid available, pt not using it. Mag replaced, rechecked ,2.5. Recheck again tomorrow.

## 2018-05-29 LAB
ALBUMIN SERPL-MCNC: 2.6 G/DL (ref 3.4–5)
ALP SERPL-CCNC: 63 U/L (ref 40–150)
ALT SERPL W P-5'-P-CCNC: 36 U/L (ref 0–70)
ANION GAP SERPL CALCULATED.3IONS-SCNC: 10 MMOL/L (ref 3–14)
AST SERPL W P-5'-P-CCNC: 32 U/L (ref 0–45)
BILIRUB SERPL-MCNC: 0.5 MG/DL (ref 0.2–1.3)
BUN SERPL-MCNC: 29 MG/DL (ref 7–30)
CALCIUM SERPL-MCNC: 7.9 MG/DL (ref 8.5–10.1)
CHLORIDE SERPL-SCNC: 105 MMOL/L (ref 94–109)
CO2 SERPL-SCNC: 26 MMOL/L (ref 20–32)
CREAT SERPL-MCNC: 1.21 MG/DL (ref 0.66–1.25)
GFR SERPL CREATININE-BSD FRML MDRD: 57 ML/MIN/1.7M2
GLUCOSE BLDC GLUCOMTR-MCNC: 113 MG/DL (ref 70–99)
GLUCOSE BLDC GLUCOMTR-MCNC: 119 MG/DL (ref 70–99)
GLUCOSE BLDC GLUCOMTR-MCNC: 124 MG/DL (ref 70–99)
GLUCOSE BLDC GLUCOMTR-MCNC: 128 MG/DL (ref 70–99)
GLUCOSE BLDC GLUCOMTR-MCNC: 129 MG/DL (ref 70–99)
GLUCOSE SERPL-MCNC: 114 MG/DL (ref 70–99)
INR PPP: 1.13 (ref 0.86–1.14)
MAGNESIUM SERPL-MCNC: 1.4 MG/DL (ref 1.6–2.3)
MAGNESIUM SERPL-MCNC: 1.9 MG/DL (ref 1.6–2.3)
MAGNESIUM SERPL-MCNC: 4.1 MG/DL (ref 1.6–2.3)
PHOSPHATE SERPL-MCNC: 3.2 MG/DL (ref 2.5–4.5)
PHOSPHATE SERPL-MCNC: 3.5 MG/DL (ref 2.5–4.5)
PLATELET # BLD AUTO: 228 10E9/L (ref 150–450)
POTASSIUM SERPL-SCNC: 3.1 MMOL/L (ref 3.4–5.3)
POTASSIUM SERPL-SCNC: 3.6 MMOL/L (ref 3.4–5.3)
PROT SERPL-MCNC: 5.4 G/DL (ref 6.8–8.8)
SODIUM SERPL-SCNC: 141 MMOL/L (ref 133–144)

## 2018-05-29 PROCEDURE — 25000128 H RX IP 250 OP 636: Performed by: SURGERY

## 2018-05-29 PROCEDURE — 83735 ASSAY OF MAGNESIUM: CPT | Performed by: NURSE PRACTITIONER

## 2018-05-29 PROCEDURE — 40000239 ZZH STATISTIC VAT ROUNDS

## 2018-05-29 PROCEDURE — A9270 NON-COVERED ITEM OR SERVICE: HCPCS | Mod: GY | Performed by: SURGERY

## 2018-05-29 PROCEDURE — 25000132 ZZH RX MED GY IP 250 OP 250 PS 637: Mod: GY | Performed by: PHYSICIAN ASSISTANT

## 2018-05-29 PROCEDURE — 40000275 ZZH STATISTIC RCP TIME EA 10 MIN

## 2018-05-29 PROCEDURE — 12000007 ZZH R&B INTERMEDIATE

## 2018-05-29 PROCEDURE — 83735 ASSAY OF MAGNESIUM: CPT | Performed by: INTERNAL MEDICINE

## 2018-05-29 PROCEDURE — 85610 PROTHROMBIN TIME: CPT | Performed by: SURGERY

## 2018-05-29 PROCEDURE — 25000128 H RX IP 250 OP 636: Performed by: PHYSICIAN ASSISTANT

## 2018-05-29 PROCEDURE — 25000125 ZZHC RX 250: Performed by: SURGERY

## 2018-05-29 PROCEDURE — 99232 SBSQ HOSP IP/OBS MODERATE 35: CPT | Performed by: INTERNAL MEDICINE

## 2018-05-29 PROCEDURE — 00000146 ZZHCL STATISTIC GLUCOSE BY METER IP

## 2018-05-29 PROCEDURE — 93005 ELECTROCARDIOGRAM TRACING: CPT

## 2018-05-29 PROCEDURE — 25000132 ZZH RX MED GY IP 250 OP 250 PS 637: Mod: GY | Performed by: SURGERY

## 2018-05-29 PROCEDURE — 25000125 ZZHC RX 250: Performed by: INTERNAL MEDICINE

## 2018-05-29 PROCEDURE — 84100 ASSAY OF PHOSPHORUS: CPT | Performed by: NURSE PRACTITIONER

## 2018-05-29 PROCEDURE — 93010 ELECTROCARDIOGRAM REPORT: CPT | Performed by: INTERNAL MEDICINE

## 2018-05-29 PROCEDURE — 84100 ASSAY OF PHOSPHORUS: CPT | Performed by: SURGERY

## 2018-05-29 PROCEDURE — 80053 COMPREHEN METABOLIC PANEL: CPT | Performed by: SURGERY

## 2018-05-29 PROCEDURE — 85049 AUTOMATED PLATELET COUNT: CPT | Performed by: SURGERY

## 2018-05-29 PROCEDURE — 84132 ASSAY OF SERUM POTASSIUM: CPT | Performed by: NURSE PRACTITIONER

## 2018-05-29 PROCEDURE — 25000128 H RX IP 250 OP 636: Performed by: INTERNAL MEDICINE

## 2018-05-29 PROCEDURE — 83735 ASSAY OF MAGNESIUM: CPT | Performed by: SURGERY

## 2018-05-29 PROCEDURE — A9270 NON-COVERED ITEM OR SERVICE: HCPCS | Mod: GY | Performed by: PHYSICIAN ASSISTANT

## 2018-05-29 RX ORDER — FUROSEMIDE 10 MG/ML
40 INJECTION INTRAMUSCULAR; INTRAVENOUS ONCE
Status: COMPLETED | OUTPATIENT
Start: 2018-05-29 | End: 2018-05-29

## 2018-05-29 RX ORDER — TAMSULOSIN HYDROCHLORIDE 0.4 MG/1
0.4 CAPSULE ORAL DAILY
Status: DISCONTINUED | OUTPATIENT
Start: 2018-05-29 | End: 2018-06-02 | Stop reason: HOSPADM

## 2018-05-29 RX ADMIN — PANTOPRAZOLE SODIUM 40 MG: 40 INJECTION, POWDER, FOR SOLUTION INTRAVENOUS at 08:23

## 2018-05-29 RX ADMIN — MAGNESIUM SULFATE IN WATER 4 G: 40 INJECTION, SOLUTION INTRAVENOUS at 19:30

## 2018-05-29 RX ADMIN — METOPROLOL TARTRATE 5 MG: 5 INJECTION, SOLUTION INTRAVENOUS at 00:01

## 2018-05-29 RX ADMIN — METOPROLOL TARTRATE 5 MG: 5 INJECTION, SOLUTION INTRAVENOUS at 17:02

## 2018-05-29 RX ADMIN — SIMETHICONE 133 MG: 20 EMULSION ORAL at 22:09

## 2018-05-29 RX ADMIN — POTASSIUM CHLORIDE 40 MEQ: 1500 TABLET, EXTENDED RELEASE ORAL at 19:02

## 2018-05-29 RX ADMIN — SIMETHICONE 133 MG: 20 EMULSION ORAL at 18:06

## 2018-05-29 RX ADMIN — SENNOSIDES AND DOCUSATE SODIUM 2 TABLET: 8.6; 5 TABLET ORAL at 19:57

## 2018-05-29 RX ADMIN — TAMSULOSIN HYDROCHLORIDE 0.4 MG: 0.4 CAPSULE ORAL at 10:44

## 2018-05-29 RX ADMIN — FUROSEMIDE 40 MG: 10 INJECTION, SOLUTION INTRAVENOUS at 10:44

## 2018-05-29 RX ADMIN — SIMETHICONE 133 MG: 20 EMULSION ORAL at 08:33

## 2018-05-29 RX ADMIN — SIMETHICONE 133 MG: 20 EMULSION ORAL at 14:21

## 2018-05-29 RX ADMIN — METOPROLOL TARTRATE 5 MG: 5 INJECTION, SOLUTION INTRAVENOUS at 22:48

## 2018-05-29 RX ADMIN — METOPROLOL TARTRATE 5 MG: 5 INJECTION, SOLUTION INTRAVENOUS at 06:12

## 2018-05-29 RX ADMIN — POTASSIUM CHLORIDE 20 MEQ: 1500 TABLET, EXTENDED RELEASE ORAL at 21:14

## 2018-05-29 RX ADMIN — I.V. FAT EMULSION 250 ML: 20 EMULSION INTRAVENOUS at 19:30

## 2018-05-29 RX ADMIN — ENOXAPARIN SODIUM 40 MG: 40 INJECTION SUBCUTANEOUS at 08:21

## 2018-05-29 NOTE — PLAN OF CARE
Problem: Patient Care Overview  Goal: Plan of Care/Patient Progress Review  Outcome: Improving  A/O. Up with SBA. Tolerating clear liquid diet. LS clear. BS Active. Passing gas. NG tube measurement unchanged. No reports of n/v. Output is brown. On Low int. Suctioning. Was clamped at 11. No reported N/V. CMS intact. Incision CDI and open to air. Metoprolol held due to heart rate under 60. Denies any pain.

## 2018-05-29 NOTE — PROGRESS NOTES
[facility] Hematology Progress Note     Primary Oncologist/Hematologist:  Dr. Mary Siddiqi [place]           Assessment and Plan:     Karlos is an 82 year old man with nausea, vomiting, early satiety and weight loss. He was admitted for abdominal exploration.     1.  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 Has had nausea with NG clamping, but is trying again today. Bowels moving.  - He has minimal pain  - We talked a little about treatment, but he would need to heal before we considered chemotherapy. We briefly touched on the regimens of FOLFOX and XELOX.  - Karlos should see Dr. Siddiqi in 2-3 weeks after discharge to discuss chemotherapy  - both he and his wife are comfortable with this plan.     2. A fib with RVR, with possible demand ischemia and EF of 35-40%.  He is now on amiodarone gtt for management of A fib with RVR. Stable.           Interval History:                 Review of Systems:   The 5 point Review of Systems is negative other than noted in the HPI             Medications:   Scheduled Medications    enoxaparin  40 mg Subcutaneous Q24H     insulin aspart  1-12 Units Subcutaneous Q4H     lipids  250 mL Intravenous Q24H     metoprolol  5 mg Intravenous Q6H     pantoprazole (PROTONIX) IV  40 mg Intravenous Daily with breakfast     senna-docusate  1 tablet Oral BID    Or     senna-docusate  2 tablet Oral BID     simethicone  133 mg Oral 4x Daily     sodium chloride (PF)  10 mL Intracatheter Q8H     sodium chloride (PF)  3 mL Intracatheter Q8H     tamsulosin  0.4 mg Oral Daily     PRN Medications  acetaminophen, sore throat lozenge, IV fluid REPLACEMENT ONLY, glucose **OR** dextrose **OR** glucagon, diphenhydrAMINE **OR** diphenhydrAMINE, levalbuterol, lidocaine 4%, lidocaine (buffered or not buffered), magnesium sulfate, naloxone, ondansetron **OR** ondansetron, oxyCODONE IR, phenol, potassium chloride, potassium  "chloride with lidocaine, potassium chloride, potassium chloride, potassium chloride, potassium phosphate (KPHOS) in D5W IV, potassium phosphate (KPHOS) in D5W IV, potassium phosphate (KPHOS) in D5W IV, potassium phosphate (KPHOS) in D5W IV, prochlorperazine **OR** prochlorperazine, sodium chloride (PF), sodium chloride (PF), umeclidinium-vilanterol               Physical Exam:   Vitals were reviewed  Blood pressure 120/76, pulse 87, temperature 97.7  F (36.5  C), temperature source Oral, resp. rate 18, height 1.702 m (5' 7\"), weight 58.6 kg (129 lb 3 oz), SpO2 98 %.  Wt Readings from Last 4 Encounters:   05/29/18 58.6 kg (129 lb 3 oz)   05/09/18 53.5 kg (118 lb)   05/04/18 53.5 kg (118 lb)       I/O last 3 completed shifts:  In: 2123 [P.O.:120; I.V.:2003]  Out: 950 [Urine:550; Emesis/NG output:400]                    Data:   All laboratory data and imaging studies reviewed.    CMP  Recent Labs  Lab 05/29/18  0615 05/28/18  0720 05/27/18  2245 05/27/18  1500 05/26/18  0640 05/24/18  0715    140  --  142  --  130*   POTASSIUM 3.6 3.8  --  4.1  --  4.1   CHLORIDE 105 105  --  106  --  97   CO2 26 25  --  26  --  21   ANIONGAP 10 10  --  10  --  12   * 111*  --  130*  --  110*   BUN 29 29  --  28  --  20   CR 1.21 1.23  --  1.41* 1.14 1.19   GFRESTIMATED 57* 56*  --  48* 62 59*   GFRESTBLACK 70 68  --  58* 74 71   LUBNA 7.9* 8.7  --  9.2  --  8.5   MAG 1.9 2.2 2.5* 1.3*  --   --    PHOS 3.2 3.1  --  3.3  --   --    PROTTOTAL 5.4*  --   --   --   --   --    ALBUMIN 2.6*  --   --   --   --   --    BILITOTAL 0.5  --   --   --   --   --    ALKPHOS 63  --   --   --   --   --    AST 32  --   --   --   --   --    ALT 36  --   --   --   --   --      CBC  Recent Labs  Lab 05/29/18  0615 05/27/18  1500 05/26/18  0640 05/24/18  0715 05/23/18  0859   WBC  --  12.7* 11.1* 15.5*  --    RBC  --  4.20* 3.88* 4.19*  --    HGB  --  12.7* 11.5* 12.7* 13.2*   HCT  --  38.1* 34.6* 36.5*  --    MCV  --  91 89 87  --    MCH  --  " 30.2 29.6 30.3  --    MCHC  --  33.3 33.2 34.8  --    RDW  --  13.3 13.1 12.5  --     288 238 240 267     INR  Recent Labs  Lab 05/29/18  0615   INR 1.13           Luis Alfredo Billingsley CNP  Nurse Practitioner  [facility]  [phone]

## 2018-05-29 NOTE — PROGRESS NOTES
X cover [id number]    Called by nursing for low urine output---> 150 mL in past 8 hours  - We will order 500 mg normal saline bolus now

## 2018-05-29 NOTE — CODE/RAPID RESPONSE
[facility]    RRT Note  5/29/2018   Time Called: 1650    Assessment & Plan     Atrial fibrillation, RVR -hemodynamically stable  I was called to evaluate Mr. Hayes for rapid atrial fibrillation with rates 160-180.  The patient is hemodynamically stable, with blood pressure 112/62, oxygen saturation 95% on room air, afebrile at 97.3 F, mentation at baseline.  The patient has had atrial fibrillation while hospitalized, treated with metoprolol IV every 6 as he is n.p.o. following surgery.  Per nursing, the patient's metoprolol was held at noon today due to heart rate of 56.  It seems there was a discrepancy between the monitor in the room, and the patient's telemetry.  Additionally, the patient is currently on TPN, magnesium this morning 1.9 and potassium 2.6, and he was diuresed with 40 mg IV Lasix.     Subjectively, the patient is completely asymptomatic. He states he would not know his heart rate is high unless the nursing staff told him.  He denies chest pain, shortness of breath, chest pressure, pleuritic chest pain, lightheadedness, dizziness or sense of anxiety.      INTERVENTIONS:  --Give 5 mg IV metoprolol now -patient's heart rate immediately decreased to less than 100  --Recheck magnesium and potassium  --Replete mag to keep over 2 and potassium greater than 4    Discussed with and defer further cares to  / Internal Medicine Hospitalist    Interval History   Mr. Metz is a pleasant 82-year-old gentleman admitted on 5/23 for abdominal exploration with a duodenal mass found to be metastatic adenocarcinoma.  He is status post GJ tube.  His postoperative course has been complicated by atrial fibrillation with rapid ventricular rate.  Following the new atrial fibrillation, an echo was obtained and demonstrated an ejection fraction estimated at 35-40%, large area of septal and anterior septal near akinesis, moderately decreased RV systolic function.  Of note, the patient does not have any  cardiac history.  He has required aggressive diuresis throughout admission, and remains net +3 600 cc.    Medical history significant for: Hypertension, COPD, BPH   Status post appendectomy, tonsillectomy and cataract repair    Code Status: Full Code    KALEB Marcos [facility]  Hospitalist Service  House Officer  Pager: [phone] (7a - 6p)      Allergies   Allergies   Allergen Reactions     Penicillins Hives       Physical Exam   Vital Signs with Ranges:  Temp:  [97.3  F (36.3  C)-97.8  F (36.6  C)] 97.3  F (36.3  C)  Pulse:  [68-87] 68  Heart Rate:  [46-84] 56  Resp:  [18] 18  BP: ()/(60-82) 98/60  SpO2:  [92 %-98 %] 98 %  I/O last 3 completed shifts:  In: 2123 [P.O.:120; I.V.:2003]  Out: 1450 [Urine:1300; Emesis/NG output:150]    Constitutional: Awake, alert, cooperative, no apparent distress.  Lying in bed, no acute uncontrolled pain.  Eyes: Conjunctiva and pupils examined and normal.  HEENT: Moist mucous membranes, normal dentition.  Respiratory: Diminished to anterior auscultation bilaterally, no crackles or wheezing.  Cardiovascular: Irregular rate and rhythm, normal S1 and S2, and no murmur noted.  Skin: Skin ashen, warm and dry  Neurologic: No gross focal neuro deficits, speech clear coherent, logical, normal strength and sensation. Alert, oriented to person, place and time  Psychiatric: No obvious anxiety or depression.    Data     EKG:  Interpreted by Chelle Michaud  Time reviewed: 1635  Symptoms at time of EKG: None   Rhythm: sinus tachycardia  Rate: Tachycardia  Ectopy: premature atrial contraction  Conduction: left anterior fasciclar block  ST Segments/ T Waves: No ST-T wave changes and No acute ischemic changes  Q Waves: none  Comparison to prior: Prior EKGs demonstrate sinus rhythm, and atrial fibrillation    Clinical Impression: supraventricular tachycardia vs atrial fib RVR    Troponin:    Recent Labs   Lab Test  05/28/18   0250   TROPI  0.759*     CBC with Diff:  Recent Labs   Lab Test   05/29/18   0615  05/27/18   1500   WBC   --   12.7*   HGB   --   12.7*   MCV   --   91   PLT  228  288   INR  1.13   --       Lactic Acid:    No results found for: LACT      Lactate for Sepsis Protocol   Date Value Ref Range Status   05/27/2018 1.3 0.4 - 1.9 mmol/L Final       Comprehensive Metabolic Panel:    Recent Labs  Lab 05/29/18  0615      POTASSIUM 3.6   CHLORIDE 105   CO2 26   ANIONGAP 10   *   BUN 29   CR 1.21   GFRESTIMATED 57*   GFRESTBLACK 70   LUBNA 7.9*   MAG 1.9   PHOS 3.2   PROTTOTAL 5.4*   ALBUMIN 2.6*   BILITOTAL 0.5   ALKPHOS 63   AST 32   ALT 36       INR:    Recent Labs   Lab Test  05/29/18   0615   INR  1.13       Time Spent on this Encounter   I spent 40 (20 face time, 20 interpreting results, updating providers and completing documentation) minutes on the unit/floor managing the care of Hussein Hayes. Over 50% of my time was spent counseling the patient and/or coordinating care regarding services listed in this note.

## 2018-05-29 NOTE — PROGRESS NOTES
[facility]    Hospitalist Progress Note    Assessment & Plan   Hussein Hayes is a 82 year old male who was admitted on 5/23/2018.  Hospitalist following the patient for elevated troponin and A. fib with RVR     Duodenal adenocarcinoma:  Postoperative ileus  -Status post exploratory laparotomy with mesenteric nodule biopsy and gastrojejunostomy with lysis of adhesions   -Patient  NG tube output has slowed down, and so surgery ordering for NG clamping trial  -TPN initiated for nutrition  -Pain controlled with PCA with no continuous rate, however patient has not required much of PCA.  -Once wound healed and after surgical recovery will need to follow-up with oncology as an outpatient    New Onset Afib with RVR  -likely in the setting of acute illness and electrolyte abnormality, patient has converted back to sinus rhythm.    Continue IV metoprolol as the patient is currently n.p.o. and not tolerating oral intake.  -Continue to replete electrolytes, echocardiogram with low EF and wall motion abnormality  -Cardiology has now signed of, continue to monitor in telemetry    Troponin leak likely demand ischemia  Cardiomyopathy-?  Tachycardia induced versus ischemic:  -Patient currently denies any chest pain, shortness of breath, troponin with decreasing trend  -Echocardiogram however revealed large area of septal and anteroseptal near akinesis, with a EF of 35-40%, no known cardiac history   -cardiology followed patient, now signed off  -Given acute surgical issues and possibility of malignancy did not recommend any acute cardiac intervention and possibility of outpatient workup and a repeat echo for comparison-updated patient, Daughter and wife about recommendation and questions answered     Nonsevere malnutrition  -In the setting of weight loss up to 7.5% in the last 3 month with intake less than 75% in the last 3 months in the context of acute illness or injury  -Due to ileus patient started on  TPN, dietitian following    Acute Kidney Injury on chronic kidney disease stage III:   Cr bumped up 1.41,But now improved and has remained stable.  Looking back at his records he is preoperative creatinine was also 1.4 so he likely has chronic kidney disease  -His urine output started decreasing since yesterday.  Overnight was given IV fluid, however has not responded significantly.   - He has low EF noted on echocardiogram.  He has also gained about 10 pounds since admission  -Renal function is stable, will give another trial of IV Lasix and reevaluate  -Continue to hold off on lisinopril HCTZ to allow room for further diuresis if needed, given soft blood pressure and patient is also n.p.o.  -Avoid NSAIDs and any other nephrotoxins  -Strict I & Os and daily weights      Hx HTN:  - Takes Lisinopril/HCTZ 20/25 mg  PTA. Started on metoprolol for A. fib with RVR.  Continue IV metoprolol.  Continue to hold lisinopril HCTZ as above    Hx COPD:   -Currently not on any exacerbation.  Patient doing well on room air  -Continue PTA Anoro Ellipta  -Xopenex nebs PRN (rather than albuterol due to avoiding tachycardia)    Pain assessment :  Current Pain Score 5/28/2018   Patient currently in pain? denies   Pain score (0-10) -   Pain location -   - Hussein is experiencing pain due to recent surgery. Pain management was discussed and the plan was created in a collaborative fashion.  Hussein's response to the current recommendations: engaged  -Patient has PCA Dilaudid available however not using it.  Primary/surgery managing pain    DVT Prophylaxis: Enoxaparin (Lovenox) SQ  Code Status: Full Code    Disposition: Expected discharge per primary    Adina Ybarra MD  Text page (7am - 6pm)    Interval History   Patient denies any chest pain, shortness of breath, dizziness, lightheadedness.  Reports pain especially when coughing on the surgical area.  No new nursing concerns.  Patient converted back to sinus rhythm    -Data reviewed  today: I reviewed all new labs and imaging results over the last 24 hours. I personally reviewed no images or EKG's today.    Physical Exam   Temp: 97.7  F (36.5  C) Temp src: Oral BP: 120/76 Pulse: 87 Heart Rate: 56 Resp: 18 SpO2: 98 % O2 Device: None (Room air)    Vitals:    05/27/18 0650 05/28/18 0606 05/29/18 0500   Weight: 56.2 kg (123 lb 14.4 oz) 55.4 kg (122 lb 2.2 oz) 58.6 kg (129 lb 3 oz)     Vital Signs with Ranges  Temp:  [97.3  F (36.3  C)-97.8  F (36.6  C)] 97.7  F (36.5  C)  Pulse:  [70-87] 87  Heart Rate:  [56-84] 56  Resp:  [16-18] 18  BP: (117-124)/(76-82) 120/76  SpO2:  [92 %-98 %] 98 %  I/O last 3 completed shifts:  In: 2123 [P.O.:120; I.V.:2003]  Out: 950 [Urine:550; Emesis/NG output:400]    Exam:  Constitutional: Awake, alert and no distress. Appears comfortable  Head: Normocephalic. No masses, lesions, tenderness or abnormalities  ENT: no neck nodes or sinus tenderness.  NG tube in place with biliary drain  Cardiovascular: RRR.  2+ murmurs, no rub or gallop no JVD  Respiratory: Normal WOB, no wheezes or crackles, however decreased air entry bilateral bases  Gastrointestinal: Abdomen soft, tender around surgical incision.  Abdominal binder in place. BS hypoactive . No masses, organomegaly  : Deferred  Extremities: 1+ edema , no clubbing /cyanosis   Skin: Warm and dry, no rash        Medications     IV fluid REPLACEMENT ONLY       HYDROmorphone       parenteral nutrition - Clinimix E       parenteral nutrition - Clinimix E 30 mL/hr at 05/28/18 2016       enoxaparin  40 mg Subcutaneous Q24H     insulin aspart  1-12 Units Subcutaneous Q4H     lipids  250 mL Intravenous Q24H     metoprolol  5 mg Intravenous Q6H     pantoprazole (PROTONIX) IV  40 mg Intravenous Daily with breakfast     senna-docusate  1 tablet Oral BID    Or     senna-docusate  2 tablet Oral BID     simethicone  133 mg Oral 4x Daily     sodium chloride (PF)  10 mL Intracatheter Q8H     sodium chloride (PF)  3 mL Intracatheter Q8H      tamsulosin  0.4 mg Oral Daily       Data     Recent Labs  Lab 05/29/18  0615 05/28/18  0720 05/28/18  0250 05/27/18  2245 05/27/18  1800 05/27/18  1500  05/26/18  0640 05/24/18  0715   WBC  --   --   --   --   --  12.7*  --  11.1* 15.5*   HGB  --   --   --   --   --  12.7*  --  11.5* 12.7*   MCV  --   --   --   --   --  91  --  89 87     --   --   --   --  288  --  238 240   INR 1.13  --   --   --   --   --   --   --   --     140  --   --   --  142  --   --  130*   POTASSIUM 3.6 3.8  --   --   --  4.1  --   --  4.1   CHLORIDE 105 105  --   --   --  106  --   --  97   CO2 26 25  --   --   --  26  --   --  21   BUN 29 29  --   --   --  28  --   --  20   CR 1.21 1.23  --   --   --  1.41*  --  1.14 1.19   ANIONGAP 10 10  --   --   --  10  --   --  12   LUBNA 7.9* 8.7  --   --   --  9.2  --   --  8.5   * 111*  --   --   --  130*  --   --  110*   ALBUMIN 2.6*  --   --   --   --   --   --   --   --    PROTTOTAL 5.4*  --   --   --   --   --   --   --   --    BILITOTAL 0.5  --   --   --   --   --   --   --   --    ALKPHOS 63  --   --   --   --   --   --   --   --    ALT 36  --   --   --   --   --   --   --   --    AST 32  --   --   --   --   --   --   --   --    TROPI  --   --  0.759* 0.995* 1.125* 1.196*  < >  --   --    < > = values in this interval not displayed.    Imaging:  No results found for this or any previous visit (from the past 24 hour(s)).

## 2018-05-29 NOTE — PROGRESS NOTES
"Surgery    No new complaints.   NG clamping trials not tolerated despite + bm's   No pain.   Walking when able, but many tubes and care teams keep him occupied.    Gen:  Awake, Alert, NAD, discouraged.  Blood pressure 117/82, pulse 70, temperature 97.8  F (36.6  C), temperature source Oral, resp. rate 18, height 1.702 m (5' 7\"), weight 58.6 kg (129 lb 3 oz), SpO2 94 %.  Resp - non labored  Abdomen - soft, non tender. Incisions healing appropriately. Binder in place.  Extremities - 1+ bilateral lower extremity edema.    Labs   Cre 1.21  INR 1.13    A/P Metastatic obstructing duodenal adenocarcinoma, s/p exploratory laparotomy with biopsy of mesenteric nodule and gastrojejunostomy bypass on 5/23/18.    - . urinary retention. Start flomax. Voiding trial tomorrow. Monitor UOP/labs.  - GI. Clamping trial. Clears --> fulls as tolerated. Continue TPN.  - DVT ppx. Lovenox  - Ambulate > QID    Fredrick Mendez PA-C  Office: [phone]  Pager: [phone]        "

## 2018-05-29 NOTE — PROGRESS NOTES
RRT was called for SVT. Pt is on RA with SpO2 98%. No RT orders at this time.  5/29/2018  Gabrielle Bliss RRT

## 2018-05-29 NOTE — PLAN OF CARE
Problem: Patient Care Overview  Goal: Discharge Needs Assessment  Outcome: No Change  Alert and oriented. VSS on room air. Denies pain, PCA available. Abdominal incision CDI with abdominal binder in place. Denies nausea/vomiting. NG to low intermittent suction. Araya patent. TPN and lipids infusing. PICC patent.

## 2018-05-29 NOTE — PROGRESS NOTES
"[facility] Progress Note  [facility]     visited pt and his spouse Pat on LOS. Pt was sitting in chair and his wife was sitting by the bed. Pt was recently diagnosed with cancer in his small intestine and had surgery to remove it. Pt will start Chemo once he is well enough. Pt and spouse both appear to be grieving the diagnosis and their uncertainty about the future. Pt's spouse reported that this is the first day there hasn't been lots of family around and it seems \"quiet\". Pt's spouse reports that this \"came out of the blue\"\" and is especially hard on everyone because pt has \"never been sick a day in his life\". Pt reported that his father lived to be 99 but said \"It just goes to show. You never know\". Pt and spouse say that they are \"not particularly Jew\" and don't really have any needs form .    SH acknowledged the fear and uncertainty of the situation and joined pt and family in lament, offering ministry of presence.     sees pt and spouse as discouraged and in anticipatory grief. Pt and spouse did name family as major source of support.      will follow.     Jhon Winter M.Div.  Chaplain Resident  [phone] Pager  "

## 2018-05-29 NOTE — PLAN OF CARE
Problem: Patient Care Overview  Goal: Plan of Care/Patient Progress Review  A/OX4. VSS. Tele: NSR. Marshal patent, low UOP, Dr. Valencia ordered NS 500ml bolus. Abd incision, w/ steri-strips,hyperactive BS, passing gas, abd binder in place, watery BMX1. NGT TO LIS.Denies pain, PCA dilaudid available, pt not using it. TPN started , infusing @30ml/hr. Lipids as ordered. PICC patent. Ambulating in the hallways w/ A1.

## 2018-05-30 LAB
ANION GAP SERPL CALCULATED.3IONS-SCNC: 7 MMOL/L (ref 3–14)
BUN SERPL-MCNC: 24 MG/DL (ref 7–30)
CALCIUM SERPL-MCNC: 8.4 MG/DL (ref 8.5–10.1)
CHLORIDE SERPL-SCNC: 101 MMOL/L (ref 94–109)
CO2 SERPL-SCNC: 30 MMOL/L (ref 20–32)
CREAT SERPL-MCNC: 0.94 MG/DL (ref 0.66–1.25)
GFR SERPL CREATININE-BSD FRML MDRD: 76 ML/MIN/1.7M2
GLUCOSE BLDC GLUCOMTR-MCNC: 125 MG/DL (ref 70–99)
GLUCOSE BLDC GLUCOMTR-MCNC: 127 MG/DL (ref 70–99)
GLUCOSE BLDC GLUCOMTR-MCNC: 140 MG/DL (ref 70–99)
GLUCOSE SERPL-MCNC: 125 MG/DL (ref 70–99)
MAGNESIUM SERPL-MCNC: 2.1 MG/DL (ref 1.6–2.3)
PHOSPHATE SERPL-MCNC: 3.5 MG/DL (ref 2.5–4.5)
POTASSIUM SERPL-SCNC: 3.4 MMOL/L (ref 3.4–5.3)
POTASSIUM SERPL-SCNC: 3.4 MMOL/L (ref 3.4–5.3)
SODIUM SERPL-SCNC: 138 MMOL/L (ref 133–144)

## 2018-05-30 PROCEDURE — 25000125 ZZHC RX 250

## 2018-05-30 PROCEDURE — 40000239 ZZH STATISTIC VAT ROUNDS

## 2018-05-30 PROCEDURE — 25000131 ZZH RX MED GY IP 250 OP 636 PS 637: Mod: GY | Performed by: SURGERY

## 2018-05-30 PROCEDURE — 25000132 ZZH RX MED GY IP 250 OP 250 PS 637: Mod: GY | Performed by: SURGERY

## 2018-05-30 PROCEDURE — 84100 ASSAY OF PHOSPHORUS: CPT | Performed by: SURGERY

## 2018-05-30 PROCEDURE — 25000128 H RX IP 250 OP 636: Performed by: INTERNAL MEDICINE

## 2018-05-30 PROCEDURE — 25000125 ZZHC RX 250: Performed by: SURGERY

## 2018-05-30 PROCEDURE — 00000146 ZZHCL STATISTIC GLUCOSE BY METER IP

## 2018-05-30 PROCEDURE — 99233 SBSQ HOSP IP/OBS HIGH 50: CPT | Performed by: INTERNAL MEDICINE

## 2018-05-30 PROCEDURE — A9270 NON-COVERED ITEM OR SERVICE: HCPCS | Mod: GY | Performed by: SURGERY

## 2018-05-30 PROCEDURE — 80048 BASIC METABOLIC PNL TOTAL CA: CPT | Performed by: SURGERY

## 2018-05-30 PROCEDURE — 99207 ZZC CDG-MDM COMPONENT: MEETS MODERATE - UP CODED: CPT | Performed by: INTERNAL MEDICINE

## 2018-05-30 PROCEDURE — 83735 ASSAY OF MAGNESIUM: CPT | Performed by: SURGERY

## 2018-05-30 PROCEDURE — 25000125 ZZHC RX 250: Performed by: INTERNAL MEDICINE

## 2018-05-30 PROCEDURE — 12000007 ZZH R&B INTERMEDIATE

## 2018-05-30 PROCEDURE — 84132 ASSAY OF SERUM POTASSIUM: CPT | Performed by: INTERNAL MEDICINE

## 2018-05-30 PROCEDURE — 25000128 H RX IP 250 OP 636: Performed by: PHYSICIAN ASSISTANT

## 2018-05-30 PROCEDURE — 25000132 ZZH RX MED GY IP 250 OP 250 PS 637: Mod: GY | Performed by: PHYSICIAN ASSISTANT

## 2018-05-30 RX ORDER — LABETALOL HYDROCHLORIDE 5 MG/ML
10 INJECTION, SOLUTION INTRAVENOUS
Status: DISCONTINUED | OUTPATIENT
Start: 2018-05-30 | End: 2018-06-02 | Stop reason: HOSPADM

## 2018-05-30 RX ORDER — FUROSEMIDE 10 MG/ML
20 INJECTION INTRAMUSCULAR; INTRAVENOUS ONCE
Status: COMPLETED | OUTPATIENT
Start: 2018-05-30 | End: 2018-05-30

## 2018-05-30 RX ADMIN — METOPROLOL TARTRATE 5 MG: 5 INJECTION, SOLUTION INTRAVENOUS at 21:47

## 2018-05-30 RX ADMIN — FUROSEMIDE 20 MG: 10 INJECTION, SOLUTION INTRAVENOUS at 10:07

## 2018-05-30 RX ADMIN — ENOXAPARIN SODIUM 40 MG: 40 INJECTION SUBCUTANEOUS at 08:29

## 2018-05-30 RX ADMIN — METOPROLOL TARTRATE 5 MG: 5 INJECTION, SOLUTION INTRAVENOUS at 05:44

## 2018-05-30 RX ADMIN — PANTOPRAZOLE SODIUM 40 MG: 40 INJECTION, POWDER, FOR SOLUTION INTRAVENOUS at 08:29

## 2018-05-30 RX ADMIN — INSULIN ASPART 1 UNITS: 100 INJECTION, SOLUTION INTRAVENOUS; SUBCUTANEOUS at 20:30

## 2018-05-30 RX ADMIN — I.V. FAT EMULSION 250 ML: 20 EMULSION INTRAVENOUS at 20:22

## 2018-05-30 RX ADMIN — METOPROLOL TARTRATE 5 MG: 5 INJECTION, SOLUTION INTRAVENOUS at 10:07

## 2018-05-30 RX ADMIN — SIMETHICONE 133 MG: 20 EMULSION ORAL at 08:38

## 2018-05-30 RX ADMIN — ASCORBIC ACID, VITAMIN A PALMITATE, CHOLECALCIFEROL, THIAMINE HYDROCHLORIDE, RIBOFLAVIN-5 PHOSPHATE SODIUM, PYRIDOXINE HYDROCHLORIDE, NIACINAMIDE, DEXPANTHENOL, ALPHA-TOCOPHEROL ACETATE, VITAMIN K1, FOLIC ACID, BIOTIN, CYANOCOBALAMIN: 200; 3300; 200; 6; 3.6; 6; 40; 15; 10; 150; 600; 60; 5 INJECTION, SOLUTION INTRAVENOUS at 22:19

## 2018-05-30 RX ADMIN — SIMETHICONE 133 MG: 20 EMULSION ORAL at 12:52

## 2018-05-30 RX ADMIN — TAMSULOSIN HYDROCHLORIDE 0.4 MG: 0.4 CAPSULE ORAL at 08:30

## 2018-05-30 RX ADMIN — METOPROLOL TARTRATE 5 MG: 5 INJECTION, SOLUTION INTRAVENOUS at 16:23

## 2018-05-30 RX ADMIN — SIMETHICONE 133 MG: 20 EMULSION ORAL at 18:45

## 2018-05-30 RX ADMIN — SIMETHICONE 133 MG: 20 EMULSION ORAL at 21:47

## 2018-05-30 RX ADMIN — INSULIN ASPART 1 UNITS: 100 INJECTION, SOLUTION INTRAVENOUS; SUBCUTANEOUS at 08:34

## 2018-05-30 NOTE — PROGRESS NOTES
[facility]  Hospitalist Progress Note  Hallie Serrano MD    Assessment & Plan   Hussein Hayes is a 82 year old male with PMHx significant for duodenal adenocarcinoma, HTN, COPD, and BPH who was admitted on 5/23/2018.  Hospitalist is following the patient for elevated troponin and A. fib with RVR.      Metastatic duodenal adenocarcinoma:  Postoperative ileus  Status post exploratory laparotomy with mesenteric nodule biopsy and gastrojejunostomy with lysis of adhesions on 05/23  -TPN initiated for nutrition  -Diet advancement per surgery. Currently on clears.  -Cont management per surgery  -Patient is to see Dr. Siddiqi in 2-3 weeks after discharge to discuss chemotherapy     New Onset Afib with RVR,  Likely in the setting of acute illness and electrolyte abnormality. Converted back to sinus rhythm. Echocardiogram 05/28  with low EF of 35-40% and large area of septal and anteroseptal near akinesis. [id number] score calculated at 4.    -Continue IV metoprolol 5 mg every 6 hrs. Will transition to po in 1-2 days  -Cont telemetry  -Continue to monitor electrolytes. Replete as indicated  -Cardiology has now signed of, continue to monitor in telemetry     Acute demand ischemia  Cardiomyopathy- Question tachycardia induced versus ischemic,  Volume overload:  Peak TnI of 1.196, likely due to acute demand ischemia. 3 L positive since admission.    Recent Labs  Lab 05/28/18  0250 05/27/18  2245 05/27/18  1800 05/27/18  1500   TROPI 0.759* 0.995* 1.125* 1.196*     Vitals:    05/28/18 0606 05/29/18 0500 05/30/18 0453   Weight: 55.4 kg (122 lb 2.2 oz) 58.6 kg (129 lb 3 oz) 58.3 kg (128 lb 8.5 oz)       -Patient currently denies any chest pain, shortness of breath  -Echocardiogram revealed large area of septal and anteroseptal near akinesis, with a EF of 35-40%, with no known cardiac history   -Cardiology consulted and given ongoing multiple medical issues, postoperative state, lack of symptoms, and  downtrending troponin, recommended no further inpatient cardiac work-up.   -Consider outpatient workup and a repeat echo for comparison based on prognosis from metastatic cancer  -IV furosemide 20 mg x1  -Continue to monitor volume status and weight      Nonsevere malnutrition  In the setting of weight loss up to 7.5% in the last 3 month with intake less than 75% in the last 3 months in the context of acute illness or injury  -Due to ileus patient started on TPN, dietitian following     BAKARI.  Cr bumped up 1.41, but now improved and has remained stable.     Recent Labs  Lab 05/30/18  0655 05/29/18  0615 05/28/18  0720 05/27/18  1500 05/26/18  0640 05/24/18  0715   CR 0.94 1.21 1.23 1.41* 1.14 1.19     -Continue to hold PTA lisinopril-HCTZ   -Avoid NSAIDs and any other nephrotoxins  -Strict I & Os and daily weights  -Cont to monitor renal function    Hyperglycemia:  Likely due to TPN.  -Check Hgb A1c  -No need for SSI      HTN:  PTA: Lisinopril/HCTZ 20/25 mg  daily  BP is currently controlled  -Started on IV metoprolol for A. fib with RVR.    -PRN IV labetalol available  -Continue to hold lisinopril HCTZ as above     COPD:   Currently stable.  -Continue PTA Anoro Ellipta  -Xopenex nebs PRN (rather than albuterol due to avoiding tachycardia)        # Pain Assessment:  Current Pain Score 5/30/2018   Patient currently in pain? denies   Pain score (0-10) -   Pain location -   Hussein alfonso pain level was assessed and he currently denies pain.        Active Diet Order      Clear Liquid Diet    DVT Prophylaxis: Lovenox  Code Status: Full code  Disposition: Pending ongoing management and hospital course. Anticipate few more days of inpatient management.    D/W RN.    Interval History   Patient feels well. Surgical pain is controlled. No issue overnight.    Data reviewed today: I reviewed all new labs and imaging over the last 24 hours. I personally reviewed the telemetry monitor showing NSR.    Physical Exam   Temp: 98.5  F  (36.9  C) Temp src: Oral BP: 127/82 Pulse: 85 Heart Rate: 90 Resp: 16 SpO2: 96 % O2 Device: None (Room air)    Vitals:    05/28/18 0606 05/29/18 0500 05/30/18 0453   Weight: 55.4 kg (122 lb 2.2 oz) 58.6 kg (129 lb 3 oz) 58.3 kg (128 lb 8.5 oz)     Vital Signs with Ranges  Temp:  [97.3  F (36.3  C)-98.5  F (36.9  C)] 98.5  F (36.9  C)  Pulse:  [68-87] 85  Heart Rate:  [] 90  Resp:  [16-18] 16  BP: ()/(60-90) 127/82  SpO2:  [96 %-99 %] 96 %  I/O's Last 24 hours  I/O last 3 completed shifts:  In: 990.4 [P.O.:150]  Out: 3075 [Urine:3075]    Constitutional: Comfortable, no acute distress  HEENT: Mild conjunctival pallor.  Neurologic: Awake, alert, fully oriented  Neck: Supple, no elevated JVP  Respiratory: Clear to auscultation  Cardiovascular: Normal S1 and S2. Regular rhythm and rate  GI: Abdomen soft, non tender, non distended  Extremities: No calf tenderness, 2+ B/L LE edema  Skin/integument: No acute rash, no cyanosis    Medications   All medications were reviewed.    IV fluid REPLACEMENT ONLY       HYDROmorphone       parenteral nutrition - Clinimix E 50 mL/hr at 05/29/18 1954       enoxaparin  40 mg Subcutaneous Q24H     insulin aspart  1-12 Units Subcutaneous Q4H     lipids  250 mL Intravenous Q24H     metoprolol  5 mg Intravenous Q6H     pantoprazole (PROTONIX) IV  40 mg Intravenous Daily with breakfast     senna-docusate  1 tablet Oral BID    Or     senna-docusate  2 tablet Oral BID     simethicone  133 mg Oral 4x Daily     sodium chloride (PF)  10 mL Intracatheter Q8H     sodium chloride (PF)  3 mL Intracatheter Q8H     tamsulosin  0.4 mg Oral Daily        Data     Recent Labs  Lab 05/30/18  0655 05/30/18  0150 05/29/18  1813 05/29/18  0615 05/28/18  0720 05/28/18  0250 05/27/18  2245 05/27/18  1800 05/27/18  1500  05/26/18  0640 05/24/18  0715   WBC  --   --   --   --   --   --   --   --  12.7*  --  11.1* 15.5*   HGB  --   --   --   --   --   --   --   --  12.7*  --  11.5* 12.7*   MCV  --   --    --   --   --   --   --   --  91  --  89 87   PLT  --   --   --  228  --   --   --   --  288  --  238 240   INR  --   --   --  1.13  --   --   --   --   --   --   --   --      --   --  141 140  --   --   --  142  --   --  130*   POTASSIUM 3.4 3.4 3.1* 3.6 3.8  --   --   --  4.1  --   --  4.1   CHLORIDE 101  --   --  105 105  --   --   --  106  --   --  97   CO2 30  --   --  26 25  --   --   --  26  --   --  21   BUN 24  --   --  29 29  --   --   --  28  --   --  20   CR 0.94  --   --  1.21 1.23  --   --   --  1.41*  --  1.14 1.19   ANIONGAP 7  --   --  10 10  --   --   --  10  --   --  12   LUBNA 8.4*  --   --  7.9* 8.7  --   --   --  9.2  --   --  8.5   *  --   --  114* 111*  --   --   --  130*  --   --  110*   ALBUMIN  --   --   --  2.6*  --   --   --   --   --   --   --   --    PROTTOTAL  --   --   --  5.4*  --   --   --   --   --   --   --   --    BILITOTAL  --   --   --  0.5  --   --   --   --   --   --   --   --    ALKPHOS  --   --   --  63  --   --   --   --   --   --   --   --    ALT  --   --   --  36  --   --   --   --   --   --   --   --    AST  --   --   --  32  --   --   --   --   --   --   --   --    TROPI  --   --   --   --   --  0.759* 0.995* 1.125* 1.196*  < >  --   --    < > = values in this interval not displayed.    No results found for this or any previous visit (from the past 24 hour(s)).

## 2018-05-30 NOTE — PROGRESS NOTES
"Surgery    No new complaints - SVT last night.  Difficulty swallowing 2  to NGT    Scant NG output.   No nausea.     Denies chest Pain, dyspnea    Gen:  Awake, Alert, NAD, sitting up in chair.  Blood pressure 127/82, pulse 85, temperature 98.5  F (36.9  C), temperature source Oral, resp. rate 16, height 1.702 m (5' 7\"), weight 58.3 kg (128 lb 8.5 oz), SpO2 96 %.  Resp - non labored.    Cardiac - Regular rate & rhythm without murmur  Abdomen - soft, non tender. Incisions healing appropriately without erythema or purulent drainage.  Extremities - mild bilat lower extremity edema     Labs ok    A/P Metastatic obstructing duodenal adenocarcinoma, s/p exploratory laparotomy with biopsy of mesenteric nodule and gastrojejunostomy bypass on 5/23/18.    - remove NGT  - clears --> fulls as tolerated.   - remove cosme for voiding trial.     Fredrick Mendez PA-C  Office: [phone]  Pager: [phone]    "

## 2018-05-30 NOTE — PLAN OF CARE
Problem: Patient Care Overview  Goal: Plan of Care/Patient Progress Review  Outcome: No Change  A/O x4. AVSS on RA. Tachy at times; managed w/ scheduled IV metoprolol. Pt denies pain, PCA available if needed. NG clamped. Denies N/V. Abd incision, steri strips, binder in place. BLE edema, +2. Tolerating clears. Araya in place, patent. K and Mg replaced.

## 2018-05-30 NOTE — PROGRESS NOTES
[facility] Progress Note     Primary Oncologist/Hematologist:  Dr. Siddiqi ([place])          Assessment and Plan:     Karlos is an 82 year old man with nausea, vomiting, early satiety and weight loss. He was admitted for abdominal exploration.      1.  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   - We talked a little about treatment, but he would need to heal before we considered chemotherapy. We briefly touched on the regimens of FOLFOX and XELOX.  - Karlos should see Dr. Siddiqi in 2-3 weeks after discharge to discuss chemotherapy  - He is comfortable with plan. No new questions.    Shaun MANUEL, CNP  [facility]  [phone]        Interval History:   NG out today. Trial of increasing diet today. No pain.              Review of Systems:   The 5 point Review of Systems is negative other than noted in the HPI             Medications:   Scheduled Medications    enoxaparin  40 mg Subcutaneous Q24H     insulin aspart  1-12 Units Subcutaneous Q4H     lipids  250 mL Intravenous Q24H     metoprolol  5 mg Intravenous Q6H     pantoprazole (PROTONIX) IV  40 mg Intravenous Daily with breakfast     senna-docusate  1 tablet Oral BID    Or     senna-docusate  2 tablet Oral BID     simethicone  133 mg Oral 4x Daily     sodium chloride (PF)  10 mL Intracatheter Q8H     sodium chloride (PF)  3 mL Intracatheter Q8H     tamsulosin  0.4 mg Oral Daily     PRN Medications  acetaminophen, sore throat lozenge, IV fluid REPLACEMENT ONLY, glucose **OR** dextrose **OR** glucagon, diphenhydrAMINE **OR** diphenhydrAMINE, labetalol, levalbuterol, lidocaine 4%, lidocaine (buffered or not buffered), magnesium sulfate, naloxone, ondansetron **OR** ondansetron, oxyCODONE IR, phenol, potassium chloride, potassium chloride with lidocaine, potassium chloride, potassium chloride, potassium chloride, potassium phosphate  "(KPHOS) in D5W IV, potassium phosphate (KPHOS) in D5W IV, potassium phosphate (KPHOS) in D5W IV, potassium phosphate (KPHOS) in D5W IV, prochlorperazine **OR** prochlorperazine, sodium chloride (PF), sodium chloride (PF), umeclidinium-vilanterol               Physical Exam:   Vitals were reviewed  Blood pressure 127/82, pulse 85, temperature 98.5  F (36.9  C), temperature source Oral, resp. rate 16, height 1.702 m (5' 7\"), weight 58.3 kg (128 lb 8.5 oz), SpO2 96 %.  Wt Readings from Last 4 Encounters:   05/30/18 58.3 kg (128 lb 8.5 oz)   05/09/18 53.5 kg (118 lb)   05/04/18 53.5 kg (118 lb)       I/O last 3 completed shifts:  In: 990.4 [P.O.:150]  Out: 3075 [Urine:3075]      Constitutional: Awake, alert, cooperative, no apparent distress          Data:   All laboratory data and imaging studies reviewed.    CMP  Recent Labs  Lab 05/30/18  0655 05/30/18  0150 05/29/18  2232 05/29/18  1813 05/29/18  0615 05/28/18  0720  05/27/18  1500     --   --   --  141 140  --  142   POTASSIUM 3.4 3.4  --  3.1* 3.6 3.8  --  4.1   CHLORIDE 101  --   --   --  105 105  --  106   CO2 30  --   --   --  26 25  --  26   ANIONGAP 7  --   --   --  10 10  --  10   *  --   --   --  114* 111*  --  130*   BUN 24  --   --   --  29 29  --  28   CR 0.94  --   --   --  1.21 1.23  --  1.41*   GFRESTIMATED 76  --   --   --  57* 56*  --  48*   GFRESTBLACK >90  --   --   --  70 68  --  58*   LUBNA 8.4*  --   --   --  7.9* 8.7  --  9.2   MAG 2.1  --  4.1* 1.4* 1.9 2.2  < > 1.3*   PHOS 3.5  --   --  3.5 3.2 3.1  --  3.3   PROTTOTAL  --   --   --   --  5.4*  --   --   --    ALBUMIN  --   --   --   --  2.6*  --   --   --    BILITOTAL  --   --   --   --  0.5  --   --   --    ALKPHOS  --   --   --   --  63  --   --   --    AST  --   --   --   --  32  --   --   --    ALT  --   --   --   --  36  --   --   --    < > = values in this interval not displayed.  CBC  Recent Labs  Lab 05/29/18  0615 05/27/18  1500 05/26/18  0640 05/24/18  0715   WBC  --  " 12.7* 11.1* 15.5*   RBC  --  4.20* 3.88* 4.19*   HGB  --  12.7* 11.5* 12.7*   HCT  --  38.1* 34.6* 36.5*   MCV  --  91 89 87   MCH  --  30.2 29.6 30.3   MCHC  --  33.3 33.2 34.8   RDW  --  13.3 13.1 12.5    288 238 240     INR  Recent Labs  Lab 05/29/18  0615   INR 1.13

## 2018-05-30 NOTE — PLAN OF CARE
Alert and oriented x4. VSS, can be a bit tachy at times. NG removed. Araya removed. Due to void, has had some dribbles, unable to void properly though. Bladder scanned for 479. Plan to continue to monitor.

## 2018-05-30 NOTE — PLAN OF CARE
Problem: Patient Care Overview  Goal: Plan of Care/Patient Progress Review  Outcome: No Change  VSS. A&O. Lung sounds clear. Abdominal incision CDI, open to air. BS active. Denies pain. NG remained clamped. Tolerating clear liquids. Up with Ax1.

## 2018-05-31 LAB
ANION GAP SERPL CALCULATED.3IONS-SCNC: 6 MMOL/L (ref 3–14)
BUN SERPL-MCNC: 29 MG/DL (ref 7–30)
CALCIUM SERPL-MCNC: 8.2 MG/DL (ref 8.5–10.1)
CHLORIDE SERPL-SCNC: 98 MMOL/L (ref 94–109)
CO2 SERPL-SCNC: 31 MMOL/L (ref 20–32)
CREAT SERPL-MCNC: 0.85 MG/DL (ref 0.66–1.25)
ERYTHROCYTE [DISTWIDTH] IN BLOOD BY AUTOMATED COUNT: 13 % (ref 10–15)
GFR SERPL CREATININE-BSD FRML MDRD: 86 ML/MIN/1.7M2
GLUCOSE BLDC GLUCOMTR-MCNC: 110 MG/DL (ref 70–99)
GLUCOSE BLDC GLUCOMTR-MCNC: 111 MG/DL (ref 70–99)
GLUCOSE BLDC GLUCOMTR-MCNC: 112 MG/DL (ref 70–99)
GLUCOSE BLDC GLUCOMTR-MCNC: 151 MG/DL (ref 70–99)
GLUCOSE SERPL-MCNC: 106 MG/DL (ref 70–99)
HBA1C MFR BLD: 6.2 % (ref 0–5.6)
HCT VFR BLD AUTO: 29.1 % (ref 40–53)
HGB BLD-MCNC: 10 G/DL (ref 13.3–17.7)
INTERPRETATION ECG - MUSE: NORMAL
MAGNESIUM SERPL-MCNC: 1.6 MG/DL (ref 1.6–2.3)
MCH RBC QN AUTO: 30.5 PG (ref 26.5–33)
MCHC RBC AUTO-ENTMCNC: 34.4 G/DL (ref 31.5–36.5)
MCV RBC AUTO: 89 FL (ref 78–100)
PHOSPHATE SERPL-MCNC: 4 MG/DL (ref 2.5–4.5)
PLATELET # BLD AUTO: 197 10E9/L (ref 150–450)
POTASSIUM SERPL-SCNC: 3.2 MMOL/L (ref 3.4–5.3)
POTASSIUM SERPL-SCNC: 3.7 MMOL/L (ref 3.4–5.3)
RBC # BLD AUTO: 3.28 10E12/L (ref 4.4–5.9)
SODIUM SERPL-SCNC: 135 MMOL/L (ref 133–144)
WBC # BLD AUTO: 8.2 10E9/L (ref 4–11)

## 2018-05-31 PROCEDURE — 84132 ASSAY OF SERUM POTASSIUM: CPT | Performed by: INTERNAL MEDICINE

## 2018-05-31 PROCEDURE — 99232 SBSQ HOSP IP/OBS MODERATE 35: CPT | Performed by: INTERNAL MEDICINE

## 2018-05-31 PROCEDURE — 85027 COMPLETE CBC AUTOMATED: CPT | Performed by: SURGERY

## 2018-05-31 PROCEDURE — 93010 ELECTROCARDIOGRAM REPORT: CPT | Performed by: INTERNAL MEDICINE

## 2018-05-31 PROCEDURE — 93005 ELECTROCARDIOGRAM TRACING: CPT

## 2018-05-31 PROCEDURE — 84100 ASSAY OF PHOSPHORUS: CPT | Performed by: SURGERY

## 2018-05-31 PROCEDURE — 25000132 ZZH RX MED GY IP 250 OP 250 PS 637: Mod: GY | Performed by: SURGERY

## 2018-05-31 PROCEDURE — 25000125 ZZHC RX 250: Performed by: SURGERY

## 2018-05-31 PROCEDURE — 40000239 ZZH STATISTIC VAT ROUNDS

## 2018-05-31 PROCEDURE — 83036 HEMOGLOBIN GLYCOSYLATED A1C: CPT | Performed by: SURGERY

## 2018-05-31 PROCEDURE — 00000146 ZZHCL STATISTIC GLUCOSE BY METER IP

## 2018-05-31 PROCEDURE — 25000128 H RX IP 250 OP 636: Performed by: PHYSICIAN ASSISTANT

## 2018-05-31 PROCEDURE — A9270 NON-COVERED ITEM OR SERVICE: HCPCS | Mod: GY | Performed by: PHYSICIAN ASSISTANT

## 2018-05-31 PROCEDURE — 40000275 ZZH STATISTIC RCP TIME EA 10 MIN

## 2018-05-31 PROCEDURE — 25000125 ZZHC RX 250: Performed by: INTERNAL MEDICINE

## 2018-05-31 PROCEDURE — 12000007 ZZH R&B INTERMEDIATE

## 2018-05-31 PROCEDURE — 83735 ASSAY OF MAGNESIUM: CPT | Performed by: SURGERY

## 2018-05-31 PROCEDURE — 80048 BASIC METABOLIC PNL TOTAL CA: CPT | Performed by: SURGERY

## 2018-05-31 PROCEDURE — 25000132 ZZH RX MED GY IP 250 OP 250 PS 637: Mod: GY | Performed by: INTERNAL MEDICINE

## 2018-05-31 PROCEDURE — A9270 NON-COVERED ITEM OR SERVICE: HCPCS | Mod: GY | Performed by: SURGERY

## 2018-05-31 PROCEDURE — 25000132 ZZH RX MED GY IP 250 OP 250 PS 637: Mod: GY | Performed by: PHYSICIAN ASSISTANT

## 2018-05-31 PROCEDURE — A9270 NON-COVERED ITEM OR SERVICE: HCPCS | Mod: GY | Performed by: INTERNAL MEDICINE

## 2018-05-31 RX ORDER — METOPROLOL TARTRATE 1 MG/ML
2.5 INJECTION, SOLUTION INTRAVENOUS EVERY 4 HOURS PRN
Status: DISCONTINUED | OUTPATIENT
Start: 2018-05-31 | End: 2018-06-02 | Stop reason: HOSPADM

## 2018-05-31 RX ORDER — METOPROLOL TARTRATE 25 MG/1
25 TABLET, FILM COATED ORAL 2 TIMES DAILY
Status: DISCONTINUED | OUTPATIENT
Start: 2018-05-31 | End: 2018-05-31

## 2018-05-31 RX ORDER — METOPROLOL SUCCINATE 50 MG/1
50 TABLET, EXTENDED RELEASE ORAL DAILY
Status: DISCONTINUED | OUTPATIENT
Start: 2018-05-31 | End: 2018-06-02 | Stop reason: HOSPADM

## 2018-05-31 RX ADMIN — SIMETHICONE 133 MG: 20 EMULSION ORAL at 21:45

## 2018-05-31 RX ADMIN — LIDOCAINE HYDROCHLORIDE 10 ML: 20 JELLY TOPICAL at 05:41

## 2018-05-31 RX ADMIN — POTASSIUM CHLORIDE 40 MEQ: 1.5 POWDER, FOR SOLUTION ORAL at 10:36

## 2018-05-31 RX ADMIN — RANITIDINE 150 MG: 150 TABLET ORAL at 21:45

## 2018-05-31 RX ADMIN — METOPROLOL SUCCINATE 50 MG: 50 TABLET, EXTENDED RELEASE ORAL at 10:35

## 2018-05-31 RX ADMIN — SIMETHICONE 133 MG: 20 EMULSION ORAL at 17:09

## 2018-05-31 RX ADMIN — POTASSIUM CHLORIDE 20 MEQ: 1.5 POWDER, FOR SOLUTION ORAL at 13:08

## 2018-05-31 RX ADMIN — RANITIDINE 150 MG: 150 TABLET ORAL at 08:42

## 2018-05-31 RX ADMIN — I.V. FAT EMULSION 250 ML: 20 EMULSION INTRAVENOUS at 21:45

## 2018-05-31 RX ADMIN — SIMETHICONE 133 MG: 20 EMULSION ORAL at 08:41

## 2018-05-31 RX ADMIN — SIMETHICONE 133 MG: 20 EMULSION ORAL at 12:08

## 2018-05-31 RX ADMIN — ENOXAPARIN SODIUM 40 MG: 40 INJECTION SUBCUTANEOUS at 08:41

## 2018-05-31 RX ADMIN — METOPROLOL TARTRATE 5 MG: 5 INJECTION, SOLUTION INTRAVENOUS at 04:11

## 2018-05-31 RX ADMIN — SENNOSIDES AND DOCUSATE SODIUM 1 TABLET: 8.6; 5 TABLET ORAL at 21:45

## 2018-05-31 RX ADMIN — ASCORBIC ACID, VITAMIN A PALMITATE, CHOLECALCIFEROL, THIAMINE HYDROCHLORIDE, RIBOFLAVIN-5 PHOSPHATE SODIUM, PYRIDOXINE HYDROCHLORIDE, NIACINAMIDE, DEXPANTHENOL, ALPHA-TOCOPHEROL ACETATE, VITAMIN K1, FOLIC ACID, BIOTIN, CYANOCOBALAMIN: 200; 3300; 200; 6; 3.6; 6; 40; 15; 10; 150; 600; 60; 5 INJECTION, SOLUTION INTRAVENOUS at 21:45

## 2018-05-31 RX ADMIN — TAMSULOSIN HYDROCHLORIDE 0.4 MG: 0.4 CAPSULE ORAL at 08:42

## 2018-05-31 NOTE — PROGRESS NOTES
Chart Check Heme/Onc    Karlos is an 82 year old man with nausea, vomiting, early satiety and weight loss. He was admitted for abdominal exploration.      1.  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   - We talked a little about treatment, but he would need to heal before we considered chemotherapy. We briefly touched on the regimens of FOLFOX and XELOX.  - Karlos should see Dr. Siddiqi in 2-3 weeks after discharge to discuss chemotherapy    Shaun MANUEL, CNP

## 2018-05-31 NOTE — PROGRESS NOTES
Comfortable, afebrile, -80  Araya in and good uop  Pain under control  Will start transition to ORAL meds and nutrition  Hope to discharge home in 1-2 days

## 2018-05-31 NOTE — PLAN OF CARE
Problem: Patient Care Overview  Goal: Plan of Care/Patient Progress Review  Outcome: No Change  VSS. A&O. Lung sounds diminished. Up with SBA. Tolerating clear liquids, denies N/V. + flatus. Abdominal incision open to air, CDI. Pt bladder scanned for 507, cosme placed per order.

## 2018-05-31 NOTE — PROGRESS NOTES
[facility]  Hospitalist Progress Note  Hallie Serrano MD    Assessment & Plan   Hussein Hayes is a 82 year old male with PMHx significant for duodenal adenocarcinoma, HTN, COPD, and BPH who was admitted on 5/23/2018.  Hospitalist is following the patient for elevated troponin and A. fib with RVR.      Metastatic duodenal adenocarcinoma:  Postoperative ileus  Status post exploratory laparotomy with mesenteric nodule biopsy and gastrojejunostomy with lysis of adhesions on 05/23  -TPN initiated for nutrition  -Diet advancement per surgery. Currently on full liquids  -Cont post-op management per surgery  -Patient is to see Dr. Siddiqi in 2-3 weeks after discharge to discuss chemotherapy     New Onset Afib with RVR,  Likely in the setting of acute illness and electrolyte abnormality. Converted back to sinus rhythm. Echocardiogram 05/28  with low EF of 35-40% and large area of septal and anteroseptal near akinesis. [id number] score calculated at 4. Started on IV  metoprolol 5 mg every 6 hrs. It was transitioned to metoprolol XL 50 mg daily 05/31.    -Cont metoprolol XL 50 mg daily  -IV metoprolol available prn  -Cont telemetry  -Continue to monitor electrolytes. Replete as indicated  -Cardiology has now signed of, continue to monitor in telemetry     Acute demand ischemia  Cardiomyopathy- Question tachycardia induced versus ischemic,  Volume overload:  Peak TnI of 1.196, likely due to acute demand ischemia. 3 L positive since admission.    Recent Labs  Lab 05/28/18  0250 05/27/18  2245 05/27/18  1800 05/27/18  1500   TROPI 0.759* 0.995* 1.125* 1.196*     Vitals:    05/29/18 0500 05/30/18 0453 05/31/18 0500   Weight: 58.6 kg (129 lb 3 oz) 58.3 kg (128 lb 8.5 oz) 57.4 kg (126 lb 8.7 oz)       -Patient currently denies any chest pain, shortness of breath  -Echocardiogram revealed large area of septal and anteroseptal near akinesis, with a EF of 35-40%, with no known cardiac history   -Cardiology  consulted and given ongoing multiple medical issues, postoperative state, lack of symptoms, and downtrending troponin, recommended no further inpatient cardiac work-up.   -Consider outpatient workup and a repeat echo for comparison based on prognosis from metastatic cancer  -Patient received IV furosemide  -Continue metoprolol XL 50 mg daily  -Continue to monitor volume status and weight    Acute blood loss anemia:  Hgb 13.2 and normal upon admission. Post-op it gradually fell to 10 on 05/31.    Recent Labs  Lab 05/31/18  0555 05/27/18  1500 05/26/18  0640   HGB 10.0* 12.7* 11.5*     -Cont to monitor Hgb periodically      Nonsevere malnutrition  In the setting of weight loss up to 7.5% in the last 3 month with intake less than 75% in the last 3 months in the context of acute illness or injury  -Due to ileus patient started on TPN, dietitian following     BAKARI.  Cr bumped up 1.41, but now improved and has remained stable.     Recent Labs  Lab 05/31/18  0555 05/30/18  0655 05/29/18  0615 05/28/18  0720 05/27/18  1500 05/26/18  0640   CR 0.85 0.94 1.21 1.23 1.41* 1.14     -Continue to hold PTA lisinopril-HCTZ   -Avoid NSAIDs and any other nephrotoxins  -Strict I & Os and daily weights  -Cont to monitor renal function    Hyperglycemia:  Likely due to TPN.  -Check Hgb A1c  -No need for SSI      HTN:  PTA: Lisinopril/HCTZ 20/25 mg  daily  BP is currently controlled  -Started on metoprolol XL 50 mg daily as above  -PRN IV labetalol available  -Continue to hold lisinopril HCTZ as above     COPD:   Currently stable.  -Continue PTA Anoro Ellipta  -Xopenex nebs PRN       # Pain Assessment:  Current Pain Score 5/31/2018   Patient currently in pain? denies   Pain score (0-10) -   Pain location -   Hussein alfonso pain level was assessed and he currently denies pain.        Active Diet Order      Advance Diet as Tolerated: Full Liquid Diet    DVT Prophylaxis: Lovenox  Code Status: Full code  Disposition: Pending ongoing management and  hospital course. Anticipate few more days of inpatient management.    D/W RN.    Interval History   Patient feels well. Surgical pain is controlled. NSR is maintained since 05/29. No new issue overnight.    Data reviewed today: I reviewed all new labs and imaging over the last 24 hours. I personally reviewed the telemetry monitor showing NSR.    Physical Exam   Temp: 98.5  F (36.9  C) Temp src: Oral BP: 110/68 Pulse: 70 Heart Rate: 77 Resp: 16 SpO2: 98 % O2 Device: None (Room air)    Vitals:    05/29/18 0500 05/30/18 0453 05/31/18 0500   Weight: 58.6 kg (129 lb 3 oz) 58.3 kg (128 lb 8.5 oz) 57.4 kg (126 lb 8.7 oz)     Vital Signs with Ranges  Temp:  [97.4  F (36.3  C)-98.5  F (36.9  C)] 98.5  F (36.9  C)  Pulse:  [70-93] 70  Heart Rate:  [] 77  Resp:  [16] 16  BP: (105-149)/(65-98) 110/68  SpO2:  [94 %-98 %] 98 %  I/O's Last 24 hours  I/O last 3 completed shifts:  In: 2135.7 [P.O.:220; I.V.:232]  Out: 650 [Urine:650]    Constitutional: Comfortable, no acute distress  HEENT: Mild conjunctival pallor.  Neurologic: Awake, alert, fully oriented  Neck: Supple, no elevated JVP  Respiratory: Clear to auscultation  Cardiovascular: Normal S1 and S2. Regular rhythm and rate  GI: Abdomen soft, non tender, non distended  Extremities: No calf tenderness, 1-2+ B/L LE edema  Skin/integument: No acute rash, no cyanosis    Medications   All medications were reviewed.    IV fluid REPLACEMENT ONLY       parenteral nutrition - Clinimix E 80 mL/hr at 05/30/18 2219       enoxaparin  40 mg Subcutaneous Q24H     insulin aspart  1-12 Units Subcutaneous Q4H     lipids  250 mL Intravenous Q24H     metoprolol  5 mg Intravenous Q6H     ranitidine  150 mg Oral BID     senna-docusate  1 tablet Oral BID    Or     senna-docusate  2 tablet Oral BID     simethicone  133 mg Oral 4x Daily     sodium chloride (PF)  10 mL Intracatheter Q8H     sodium chloride (PF)  3 mL Intracatheter Q8H     tamsulosin  0.4 mg Oral Daily        Data     Recent  Labs  Lab 05/31/18  0555 05/30/18  0655 05/30/18  0150  05/29/18  0615  05/28/18  0250 05/27/18  2245 05/27/18  1800 05/27/18  1500  05/26/18  0640   WBC 8.2  --   --   --   --   --   --   --   --  12.7*  --  11.1*   HGB 10.0*  --   --   --   --   --   --   --   --  12.7*  --  11.5*   MCV 89  --   --   --   --   --   --   --   --  91  --  89     --   --   --  228  --   --   --   --  288  --  238   INR  --   --   --   --  1.13  --   --   --   --   --   --   --     138  --   --  141  < >  --   --   --  142  --   --    POTASSIUM 3.2* 3.4 3.4  < > 3.6  < >  --   --   --  4.1  --   --    CHLORIDE 98 101  --   --  105  < >  --   --   --  106  --   --    CO2 31 30  --   --  26  < >  --   --   --  26  --   --    BUN 29 24  --   --  29  < >  --   --   --  28  --   --    CR 0.85 0.94  --   --  1.21  < >  --   --   --  1.41*  --  1.14   ANIONGAP 6 7  --   --  10  < >  --   --   --  10  --   --    LUBNA 8.2* 8.4*  --   --  7.9*  < >  --   --   --  9.2  --   --    * 125*  --   --  114*  < >  --   --   --  130*  --   --    ALBUMIN  --   --   --   --  2.6*  --   --   --   --   --   --   --    PROTTOTAL  --   --   --   --  5.4*  --   --   --   --   --   --   --    BILITOTAL  --   --   --   --  0.5  --   --   --   --   --   --   --    ALKPHOS  --   --   --   --  63  --   --   --   --   --   --   --    ALT  --   --   --   --  36  --   --   --   --   --   --   --    AST  --   --   --   --  32  --   --   --   --   --   --   --    TROPI  --   --   --   --   --   --  0.759* 0.995* 1.125* 1.196*  < >  --    < > = values in this interval not displayed.    No results found for this or any previous visit (from the past 24 hour(s)).

## 2018-05-31 NOTE — PLAN OF CARE
Problem: Patient Care Overview  Goal: Plan of Care/Patient Progress Review  Outcome: Improving  A/O x4. AVSS on RA. Tachy at times; managed w/ scheduled IV metoprolol. Tele ST. Pt denies pain, PCA available if needed. Denies N/V. Abd incision, steri strips, binder in place. BLE edema, +2. Tolerating full liquid diet. Araya out today, low urine output.

## 2018-05-31 NOTE — PLAN OF CARE
Alert and orietned x4. VSS. Denies pain. Up SBa. Araya patent. PCA d/c'd. Tolerating fulls. Potassium replaced recheck scheduled. Bowel movement x1. Plan to possibly discharge to home in 1-2 days.

## 2018-06-01 LAB
ANION GAP SERPL CALCULATED.3IONS-SCNC: 9 MMOL/L (ref 3–14)
BUN SERPL-MCNC: 30 MG/DL (ref 7–30)
CALCIUM SERPL-MCNC: 8.4 MG/DL (ref 8.5–10.1)
CHLORIDE SERPL-SCNC: 97 MMOL/L (ref 94–109)
CO2 SERPL-SCNC: 27 MMOL/L (ref 20–32)
CREAT SERPL-MCNC: 0.93 MG/DL (ref 0.66–1.25)
GFR SERPL CREATININE-BSD FRML MDRD: 78 ML/MIN/1.7M2
GLUCOSE BLDC GLUCOMTR-MCNC: 108 MG/DL (ref 70–99)
GLUCOSE BLDC GLUCOMTR-MCNC: 114 MG/DL (ref 70–99)
GLUCOSE BLDC GLUCOMTR-MCNC: 117 MG/DL (ref 70–99)
GLUCOSE BLDC GLUCOMTR-MCNC: 119 MG/DL (ref 70–99)
GLUCOSE BLDC GLUCOMTR-MCNC: 122 MG/DL (ref 70–99)
GLUCOSE BLDC GLUCOMTR-MCNC: 128 MG/DL (ref 70–99)
GLUCOSE BLDC GLUCOMTR-MCNC: 128 MG/DL (ref 70–99)
GLUCOSE SERPL-MCNC: 88 MG/DL (ref 70–99)
MAGNESIUM SERPL-MCNC: 1.6 MG/DL (ref 1.6–2.3)
PHOSPHATE SERPL-MCNC: 4.1 MG/DL (ref 2.5–4.5)
PLATELET # BLD AUTO: 255 10E9/L (ref 150–450)
POTASSIUM SERPL-SCNC: 3.9 MMOL/L (ref 3.4–5.3)
SODIUM SERPL-SCNC: 133 MMOL/L (ref 133–144)

## 2018-06-01 PROCEDURE — 99232 SBSQ HOSP IP/OBS MODERATE 35: CPT | Performed by: INTERNAL MEDICINE

## 2018-06-01 PROCEDURE — A9270 NON-COVERED ITEM OR SERVICE: HCPCS | Mod: GY | Performed by: INTERNAL MEDICINE

## 2018-06-01 PROCEDURE — 00000146 ZZHCL STATISTIC GLUCOSE BY METER IP

## 2018-06-01 PROCEDURE — 25000132 ZZH RX MED GY IP 250 OP 250 PS 637: Mod: GY | Performed by: INTERNAL MEDICINE

## 2018-06-01 PROCEDURE — 25000128 H RX IP 250 OP 636: Performed by: PHYSICIAN ASSISTANT

## 2018-06-01 PROCEDURE — 25000132 ZZH RX MED GY IP 250 OP 250 PS 637: Mod: GY | Performed by: SURGERY

## 2018-06-01 PROCEDURE — 84100 ASSAY OF PHOSPHORUS: CPT | Performed by: PHYSICIAN ASSISTANT

## 2018-06-01 PROCEDURE — 80048 BASIC METABOLIC PNL TOTAL CA: CPT | Performed by: PHYSICIAN ASSISTANT

## 2018-06-01 PROCEDURE — 25000132 ZZH RX MED GY IP 250 OP 250 PS 637: Mod: GY | Performed by: PHYSICIAN ASSISTANT

## 2018-06-01 PROCEDURE — 83735 ASSAY OF MAGNESIUM: CPT | Performed by: PHYSICIAN ASSISTANT

## 2018-06-01 PROCEDURE — 40000239 ZZH STATISTIC VAT ROUNDS

## 2018-06-01 PROCEDURE — A9270 NON-COVERED ITEM OR SERVICE: HCPCS | Mod: GY | Performed by: SURGERY

## 2018-06-01 PROCEDURE — A9270 NON-COVERED ITEM OR SERVICE: HCPCS | Mod: GY | Performed by: PHYSICIAN ASSISTANT

## 2018-06-01 PROCEDURE — 25000128 H RX IP 250 OP 636: Performed by: INTERNAL MEDICINE

## 2018-06-01 PROCEDURE — 12000007 ZZH R&B INTERMEDIATE

## 2018-06-01 PROCEDURE — 85049 AUTOMATED PLATELET COUNT: CPT | Performed by: PHYSICIAN ASSISTANT

## 2018-06-01 RX ORDER — FUROSEMIDE 10 MG/ML
20 INJECTION INTRAMUSCULAR; INTRAVENOUS 2 TIMES DAILY
Status: DISCONTINUED | OUTPATIENT
Start: 2018-06-01 | End: 2018-06-02

## 2018-06-01 RX ADMIN — RANITIDINE 150 MG: 150 TABLET ORAL at 08:38

## 2018-06-01 RX ADMIN — METOPROLOL SUCCINATE 50 MG: 50 TABLET, EXTENDED RELEASE ORAL at 08:38

## 2018-06-01 RX ADMIN — FUROSEMIDE 20 MG: 10 INJECTION, SOLUTION INTRAMUSCULAR; INTRAVENOUS at 13:48

## 2018-06-01 RX ADMIN — SENNOSIDES AND DOCUSATE SODIUM 1 TABLET: 8.6; 5 TABLET ORAL at 08:38

## 2018-06-01 RX ADMIN — FUROSEMIDE 20 MG: 10 INJECTION, SOLUTION INTRAMUSCULAR; INTRAVENOUS at 22:00

## 2018-06-01 RX ADMIN — SIMETHICONE 133 MG: 20 EMULSION ORAL at 08:38

## 2018-06-01 RX ADMIN — SIMETHICONE 133 MG: 20 EMULSION ORAL at 19:27

## 2018-06-01 RX ADMIN — ENOXAPARIN SODIUM 40 MG: 40 INJECTION SUBCUTANEOUS at 08:38

## 2018-06-01 RX ADMIN — SIMETHICONE 133 MG: 20 EMULSION ORAL at 12:29

## 2018-06-01 RX ADMIN — TAMSULOSIN HYDROCHLORIDE 0.4 MG: 0.4 CAPSULE ORAL at 08:38

## 2018-06-01 NOTE — PROGRESS NOTES
Pt A&Ox4, VSS, TPN decreased to 40 ml/hr thru PICC line in RUE. Flatus+, cosme in place. Discharge home expected tomorrow with cosme in place. Started on mechanical soft, tolerated well.

## 2018-06-01 NOTE — PROGRESS NOTES
CLINICAL NUTRITION SERVICES - REASSESSMENT NOTE      Recommendations Ordered by Registered Dietitian (RD):  Change flavor of Boost Plus to vanilla only (pt request).   Malnutrition:  ()  % Weight Loss:  Up to 7.5% in 3 months (non-severe malnutrition)  % Intake:  <75% for >/= 3 months (non-severe malnutrition)  Subcutaneous Fat Loss:  None observed  Muscle Loss:  None observed  Fluid Retention:  None noted      Malnutrition Diagnosis: Non-Severe malnutrition  In Context of:  Acute illness or injury        EVALUATION OF PROGRESS TOWARD GOALS   Diet:  Mech Soft, 6 small feedings + Boost Plus BID btw meals.    Nutrition Support:  Clinimix TPN was started on  at 30 mL/hr, increased to 50 mL/hr on , and increased to goal 80 mL/hr on .  TPN to be weaned off today.     Intake/Tolerance (TPN):  Refeeding labs were as follows (-):  K:  3.6 --> 3.4 --> 3.2 (L) --> 3.9 - acceptable  M.9 -->  2.1 --> 1.6 --> 1.6 - acceptable  Phos:  3.2 --> 3.5 --> 4.0 --> 4.1 - acceptable  BGM:  108-128 range past 24 hrs - acceptable.  Wt:  57.9 kg (up 3.6 kg since admit).  BLE edema +2.    Intake/Toleerance (oral):  Pt started sips of CL on , then advanced from CL --> FL on  and Boost Plus BID btw meals was added.  Pt had fair oral intake yesterday.  This morning he at 1/2 breakfast (scrambled eggs, sausage).  He drank 1/2 of his Boost.  He prefers vanilla flavor.    ASSESSED NUTRITION NEEDS:    Dosing Weight 54.3 kg -     Estimated Energy Needs: 3125-2482 kcals (30-35 Kcal/Kg)  Justification: underweight  Estimated Protein Needs:  grams protein (1.5-2 g pro/Kg)  Justification: Repletion      NEW FINDINGS:   + stool x 3 .  Plan chemo in future.  Possible D/C home later today.    Previous Goals ():   TPN will reach goal rate of 80 mL/hr within the next 72  hrs  Evaluation: Met, but now TPN off    Previous Nutrition Diagnosis ():   Inadequate protein-energy intake related to altered GI  function as evidenced by report of minimal oral intake and 5% wt loss x 3 months  Evaluation: Improving      CURRENT NUTRITION DIAGNOSIS  Inadequate oral intake related to decreased appetite and early satiety s/p GI surgery as evidenced by oral intake fair at best.    INTERVENTIONS  Recommendations / Nutrition Prescription  Diet as above.  Change flavor of Boost Plus to vanilla only (pt request).    Implementation  Collaboration and Referral of Nutrition care - Discussed with Pharmacist the plan to wean off TPN today.  Medical Food Supplement - Modified supplement flavor in [employer]  Nutrition Education - Discussed the use of supplements for home.  They had purchases some Premier Protein shakes and asked if okay to use.  Informed them that Boost Plus would be preferable, as higher in kcals.  Recommended that they mix the Boost Plus with Premier Protein shake.  Discussed small frequent feedings strategy.    Goals  Pt will consume > 75 meals and supplements.    MONITORING AND EVALUATION:  Progress towards goals will be monitored and evaluated per protocol and Practice Guidelines    Jennifer Vanegas, RICK, LD, [facility]

## 2018-06-01 NOTE — PLAN OF CARE
Problem: Patient Care Overview  Goal: Plan of Care/Patient Progress Review  Outcome: No Change  Pt A&Ox4, VSS, TPN & lipids running thru PICC line in RUE. Flatus+, cosme in place. K+ replaced, initial recheck at 3.7, another recheck scheduled for 0600. Q4 BG checks. Discharge home expected today/tomorrow with cosme in place.

## 2018-06-01 NOTE — PROGRESS NOTES
[facility] Progress Note     Primary Oncologist/Hematologist:  Dr. Siddiqi ([place])          Assessment and Plan:     Karlos is an 82 year old man with nausea, vomiting, early satiety and weight loss. He was admitted for abdominal exploration.      1.  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   - We talked a little about treatment, but he would need to heal before we considered chemotherapy. We briefly touched on the regimens of FOLFOX and XELOX.  - Karlos should see Dr. Siddiqi in 2-3 weeks after discharge to discuss chemotherapy  - He is comfortable with plan. No new questions.  - I will have the clinic contact him with appointment dates and times.    2. Shortness of breath  - New onset dyspnea with minimal exertion  - History of COPD, but this is not characteristic of exacerbation  - I/O is positive, edema present. This is likely fluid overload.  - Sounds like CXR is being done later today  - On prophylactic LMWH    3. Edema  - Suspect volume overload, could be nutritional deficit with low albumin  - Consider US of the legs to evaluate for DVT with cancer, immobility    Shaun MANUEL, CNP  [facility]  [phone]        Interval History:   New onset shortness of breath with minimal exertion. Not eating much. Feels full. He wants to go home, but his family is concerned about his intake and breathing issue.              Review of Systems:   The 5 point Review of Systems is negative other than noted in the HPI             Medications:   Scheduled Medications    enoxaparin  40 mg Subcutaneous Q24H     metoprolol succinate  50 mg Oral Daily     ranitidine  150 mg Oral BID     senna-docusate  1 tablet Oral BID    Or     senna-docusate  2 tablet Oral BID     simethicone  133 mg Oral 4x Daily     sodium chloride (PF)  10 mL Intracatheter Q8H     sodium chloride (PF)  3 mL Intracatheter Q8H     tamsulosin   "0.4 mg Oral Daily     PRN Medications  acetaminophen, sore throat lozenge, IV fluid REPLACEMENT ONLY, glucose **OR** dextrose **OR** glucagon, diphenhydrAMINE **OR** diphenhydrAMINE, labetalol, levalbuterol, lidocaine 4%, lidocaine (buffered or not buffered), magnesium sulfate, metoprolol, naloxone, ondansetron **OR** ondansetron, oxyCODONE IR, phenol, potassium chloride, potassium chloride with lidocaine, potassium chloride, potassium chloride, potassium chloride, potassium phosphate (KPHOS) in D5W IV, potassium phosphate (KPHOS) in D5W IV, potassium phosphate (KPHOS) in D5W IV, potassium phosphate (KPHOS) in D5W IV, prochlorperazine **OR** prochlorperazine, sodium chloride (PF), sodium chloride (PF), umeclidinium-vilanterol               Physical Exam:   Vitals were reviewed  Blood pressure 115/76, pulse 80, temperature 98.7  F (37.1  C), temperature source Oral, resp. rate 18, height 1.702 m (5' 7\"), weight 57.9 kg (127 lb 10.3 oz), SpO2 96 %.  Wt Readings from Last 4 Encounters:   06/01/18 57.9 kg (127 lb 10.3 oz)   05/09/18 53.5 kg (118 lb)   05/04/18 53.5 kg (118 lb)       I/O last 3 completed shifts:  In: 1880 [I.V.:640]  Out: 1600 [Urine:1600]      Constitutional: Awake, alert, cooperative, no apparent distress   Lungs: Nonlabored at rest. Diminished breath sounds.   Cardiovascular: Regular rate and rhythm, normal S1 and S2, and no murmur noted   Abdomen: Abdominal binder in place   Skin: 2+ bilateral lower extremity edema   Other:               Data:   All laboratory data and imaging studies reviewed.    CMP  Recent Labs  Lab 06/01/18  0625 05/31/18  1715 05/31/18  0555 05/30/18  0655  05/29/18  2232 05/29/18  1813 05/29/18  0615     --  135 138  --   --   --  141   POTASSIUM 3.9 3.7 3.2* 3.4  < >  --  3.1* 3.6   CHLORIDE 97  --  98 101  --   --   --  105   CO2 27  --  31 30  --   --   --  26   ANIONGAP 9  --  6 7  --   --   --  10   GLC 88  --  106* 125*  --   --   --  114*   BUN 30  --  29 24  --   " --   --  29   CR 0.93  --  0.85 0.94  --   --   --  1.21   GFRESTIMATED 78  --  86 76  --   --   --  57*   GFRESTBLACK >90  --  >90 >90  --   --   --  70   LUBNA 8.4*  --  8.2* 8.4*  --   --   --  7.9*   MAG 1.6  --  1.6 2.1  --  4.1* 1.4* 1.9   PHOS 4.1  --  4.0 3.5  --   --  3.5 3.2   PROTTOTAL  --   --   --   --   --   --   --  5.4*   ALBUMIN  --   --   --   --   --   --   --  2.6*   BILITOTAL  --   --   --   --   --   --   --  0.5   ALKPHOS  --   --   --   --   --   --   --  63   AST  --   --   --   --   --   --   --  32   ALT  --   --   --   --   --   --   --  36   < > = values in this interval not displayed.  CBC  Recent Labs  Lab 06/01/18  0625 05/31/18  0555 05/29/18  0615 05/27/18  1500 05/26/18  0640   WBC  --  8.2  --  12.7* 11.1*   RBC  --  3.28*  --  4.20* 3.88*   HGB  --  10.0*  --  12.7* 11.5*   HCT  --  29.1*  --  38.1* 34.6*   MCV  --  89  --  91 89   MCH  --  30.5  --  30.2 29.6   MCHC  --  34.4  --  33.3 33.2   RDW  --  13.0  --  13.3 13.1    197 228 288 238     INR  Recent Labs  Lab 05/29/18  0615   INR 1.13

## 2018-06-01 NOTE — PROGRESS NOTES
[facility]  Hospitalist Progress Note  Hallie Serrano MD    Assessment & Plan   Hussein Hayes is a 82 year old male with PMHx significant for duodenal adenocarcinoma, HTN, COPD, and BPH who was admitted on 5/23/2018.  Hospitalist is following the patient for elevated troponin and A. fib with RVR.      Metastatic duodenal adenocarcinoma:  Postoperative ileus  Status post exploratory laparotomy with mesenteric nodule biopsy and gastrojejunostomy with lysis of adhesions on 05/23  -TPN initiated for nutrition  -Diet advancement per surgery. Currently on mechanical soft.  -Cont post-op management per surgery  -Patient is to see Dr. Siddiqi in 2-3 weeks after discharge to discuss chemotherapy     New Onset Afib with RVR,  Likely in the setting of acute illness and electrolyte abnormality. Converted back to sinus rhythm. Echocardiogram 05/28  with low EF of 35-40% and large area of septal and anteroseptal near akinesis. [id number] score calculated at 4. Started on IV  metoprolol 5 mg every 6 hrs. It was transitioned to metoprolol XL 50 mg daily 05/31.    -Cont metoprolol XL 50 mg daily  -IV metoprolol available prn  -Cont telemetry  -Continue to monitor electrolytes. Replete as indicated.  -Cardiology has now signed of, continue to monitor in telemetry     Acute demand ischemia  Cardiomyopathy- Question tachycardia induced versus ischemic,  Volume overload:  Peak TnI of 1.196, likely due to acute demand ischemia. 4.7 L positive since admission.    Recent Labs  Lab 05/28/18  0250 05/27/18  2245 05/27/18  1800 05/27/18  1500   TROPI 0.759* 0.995* 1.125* 1.196*     Vitals:    05/30/18 0453 05/31/18 0500 06/01/18 0600   Weight: 58.3 kg (128 lb 8.5 oz) 57.4 kg (126 lb 8.7 oz) 57.9 kg (127 lb 10.3 oz)       -Patient currently denies any chest pain, shortness of breath  -Echocardiogram 05/28 revealed large area of septal and anteroseptal near akinesis, with a EF of 35-40%, with no known cardiac  history   -Cardiology consulted and given ongoing multiple medical issues, postoperative state, lack of symptoms, and downtrending troponin, recommended no further inpatient cardiac work-up.   -Consider outpatient workup and a repeat echo for comparison based on prognosis from metastatic cancer  -Started on Lasix 20 mg bid x4 doses 06/01 for DIAZ  -Continue metoprolol XL 50 mg daily  -Continue to monitor volume status and weight    Acute blood loss anemia:  Hgb 13.2 and normal upon admission. Post-op it gradually fell to 10 on 05/31.    Recent Labs  Lab 05/31/18  0555 05/27/18  1500 05/26/18  0640   HGB 10.0* 12.7* 11.5*     -Cont to monitor Hgb periodically      Nonsevere malnutrition  In the setting of weight loss up to 7.5% in the last 3 month with intake less than 75% in the last 3 months in the context of acute illness or injury  -Due to ileus patient started on TPN, dietitian following. Plan for weaning TPN     BAKARI.  Cr bumped up 1.41, but now improved and has remained stable.     Recent Labs  Lab 06/01/18  0625 05/31/18  0555 05/30/18  0655 05/29/18  0615 05/28/18  0720 05/27/18  1500   CR 0.93 0.85 0.94 1.21 1.23 1.41*     -Continue to hold PTA lisinopril-HCTZ   -Avoid NSAIDs and any other nephrotoxins  -Strict I & Os and daily weights  -Cont to monitor renal function    Hyperglycemia:  Likely due to TPN. Hgb A1c 6.2%.    -No need for SSI      HTN:  PTA: Lisinopril/HCTZ 20/25 mg  daily  BP is currently controlled  -Started on metoprolol XL 50 mg daily as above  -PRN IV labetalol available  -Continue to hold lisinopril HCTZ as above     COPD:   Currently stable.  -Continue PTA Anoro Ellipta  -Xopenex nebs PRN       # Pain Assessment:  Current Pain Score 5/31/2018   Patient currently in pain? denies   Pain score (0-10) -   Pain location -   Hussein alfonso pain level was assessed and he currently denies pain.        Active Diet Order      Combination Diet Six Small Feedings Adult; Mechanical/Dental Soft Diet       Diet    DVT Prophylaxis: Lovenox  Code Status: Full code  Disposition: Pending ongoing management and hospital course. Anticipate 1-2 more days of inpatient management. Ultimately per surgery service.    D/W family 06/01.  D/W charge nurse.    Interval History   Patient feels upset that he has gained around 10 lbs since admission and he feels volume overloaded and short of breath with minimal exertion. No other issues overnight.    Data reviewed today: I reviewed all new labs and imaging over the last 24 hours. I personally reviewed the telemetry monitor showing NSR.    Physical Exam   Temp: 98.7  F (37.1  C) Temp src: Oral BP: 115/76 Pulse: 80 Heart Rate: 89 Resp: 18 SpO2: 96 % O2 Device: None (Room air)    Vitals:    05/30/18 0453 05/31/18 0500 06/01/18 0600   Weight: 58.3 kg (128 lb 8.5 oz) 57.4 kg (126 lb 8.7 oz) 57.9 kg (127 lb 10.3 oz)     Vital Signs with Ranges  Temp:  [97.4  F (36.3  C)-98.7  F (37.1  C)] 98.7  F (37.1  C)  Pulse:  [] 80  Heart Rate:  [] 89  Resp:  [16-20] 18  BP: (111-147)/(72-92) 115/76  SpO2:  [96 %-98 %] 96 %  I/O's Last 24 hours  I/O last 3 completed shifts:  In: 1880 [I.V.:640]  Out: 1600 [Urine:1600]    Constitutional: Comfortable  HEENT: Mild conjunctival pallor.  Neurologic: Awake, alert, fully oriented  Neck: Supple, no elevated JVP  Respiratory: Clear to auscultation  Cardiovascular: Normal S1 and S2. Regular rhythm and rate  GI: Abdomen soft, non tender, non distended  Extremities: No calf tenderness, 1-2+ B/L LE edema  Skin/integument: No acute rash, no cyanosis    Medications   All medications were reviewed.    IV fluid REPLACEMENT ONLY         enoxaparin  40 mg Subcutaneous Q24H     metoprolol succinate  50 mg Oral Daily     ranitidine  150 mg Oral BID     senna-docusate  1 tablet Oral BID    Or     senna-docusate  2 tablet Oral BID     simethicone  133 mg Oral 4x Daily     sodium chloride (PF)  10 mL Intracatheter Q8H     sodium chloride (PF)  3 mL Intracatheter  Q8H     tamsulosin  0.4 mg Oral Daily        Data     Recent Labs  Lab 06/01/18  0625 05/31/18  1715 05/31/18  0555 05/30/18  0655  05/29/18  0615  05/28/18  0250 05/27/18  2245 05/27/18  1800 05/27/18  1500  05/26/18  0640   WBC  --   --  8.2  --   --   --   --   --   --   --  12.7*  --  11.1*   HGB  --   --  10.0*  --   --   --   --   --   --   --  12.7*  --  11.5*   MCV  --   --  89  --   --   --   --   --   --   --  91  --  89     --  197  --   --  228  --   --   --   --  288  --  238   INR  --   --   --   --   --  1.13  --   --   --   --   --   --   --      --  135 138  --  141  < >  --   --   --  142  --   --    POTASSIUM 3.9 3.7 3.2* 3.4  < > 3.6  < >  --   --   --  4.1  --   --    CHLORIDE 97  --  98 101  --  105  < >  --   --   --  106  --   --    CO2 27  --  31 30  --  26  < >  --   --   --  26  --   --    BUN 30  --  29 24  --  29  < >  --   --   --  28  --   --    CR 0.93  --  0.85 0.94  --  1.21  < >  --   --   --  1.41*  --  1.14   ANIONGAP 9  --  6 7  --  10  < >  --   --   --  10  --   --    LUBNA 8.4*  --  8.2* 8.4*  --  7.9*  < >  --   --   --  9.2  --   --    GLC 88  --  106* 125*  --  114*  < >  --   --   --  130*  --   --    ALBUMIN  --   --   --   --   --  2.6*  --   --   --   --   --   --   --    PROTTOTAL  --   --   --   --   --  5.4*  --   --   --   --   --   --   --    BILITOTAL  --   --   --   --   --  0.5  --   --   --   --   --   --   --    ALKPHOS  --   --   --   --   --  63  --   --   --   --   --   --   --    ALT  --   --   --   --   --  36  --   --   --   --   --   --   --    AST  --   --   --   --   --  32  --   --   --   --   --   --   --    TROPI  --   --   --   --   --   --   --  0.759* 0.995* 1.125* 1.196*  < >  --    < > = values in this interval not displayed.    No results found for this or any previous visit (from the past 24 hour(s)).

## 2018-06-01 NOTE — PROGRESS NOTES
"Surgery    Tolerating fulls  No N/V  Walking.   He does notice wheezing with activity and needing to rest on walks. He has used his inhaler with relief.    Gen:  Awake, Alert, NAD  Blood pressure 115/76, pulse 80, temperature 98.7  F (37.1  C), temperature source Oral, resp. rate 18, height 1.702 m (5' 7\"), weight 57.9 kg (127 lb 10.3 oz), SpO2 96 %.  Resp - non labored    Abdomen - soft, non tender, non distended. Incisions healing appropriately without erythema or purulent drainage.  Extremities - trace bilateral ower extremity edema.    Labs ok    A/P     - ?CXR for dyspea/wheeze vs fluid management.  - wean TPN  - continue fulls.   - possible home later today (with ba)  - recheck this afternoon.     Fredrick Mendez PA-C  Office: [phone]  Pager: [phone]    "

## 2018-06-02 ENCOUNTER — APPOINTMENT (OUTPATIENT)
Dept: GENERAL RADIOLOGY | Facility: CLINIC | Age: 83
DRG: 327 | End: 2018-06-02
Attending: PHYSICIAN ASSISTANT
Payer: MEDICARE

## 2018-06-02 VITALS
RESPIRATION RATE: 18 BRPM | HEIGHT: 67 IN | OXYGEN SATURATION: 96 % | TEMPERATURE: 97.8 F | BODY MASS INDEX: 19.03 KG/M2 | SYSTOLIC BLOOD PRESSURE: 104 MMHG | DIASTOLIC BLOOD PRESSURE: 66 MMHG | HEART RATE: 88 BPM | WEIGHT: 121.25 LBS

## 2018-06-02 LAB
ALBUMIN UR-MCNC: ABNORMAL MG/DL
ALBUMIN UR-MCNC: NEGATIVE MG/DL
ANION GAP SERPL CALCULATED.3IONS-SCNC: 10 MMOL/L (ref 3–14)
APPEARANCE UR: ABNORMAL
APPEARANCE UR: CLEAR
BILIRUB UR QL STRIP: ABNORMAL
BILIRUB UR QL STRIP: NEGATIVE
BUN SERPL-MCNC: 30 MG/DL (ref 7–30)
CALCIUM SERPL-MCNC: 8.5 MG/DL (ref 8.5–10.1)
CHLORIDE SERPL-SCNC: 94 MMOL/L (ref 94–109)
CO2 SERPL-SCNC: 30 MMOL/L (ref 20–32)
COLOR UR AUTO: ABNORMAL
COLOR UR AUTO: YELLOW
CREAT SERPL-MCNC: 1.05 MG/DL (ref 0.66–1.25)
ERYTHROCYTE [DISTWIDTH] IN BLOOD BY AUTOMATED COUNT: 13.4 % (ref 10–15)
GFR SERPL CREATININE-BSD FRML MDRD: 68 ML/MIN/1.7M2
GLUCOSE BLDC GLUCOMTR-MCNC: 107 MG/DL (ref 70–99)
GLUCOSE SERPL-MCNC: 106 MG/DL (ref 70–99)
GLUCOSE UR STRIP-MCNC: ABNORMAL MG/DL
GLUCOSE UR STRIP-MCNC: NEGATIVE MG/DL
HCT VFR BLD AUTO: 33.7 % (ref 40–53)
HGB BLD-MCNC: 11.4 G/DL (ref 13.3–17.7)
HGB UR QL STRIP: ABNORMAL
HGB UR QL STRIP: ABNORMAL
KETONES UR STRIP-MCNC: ABNORMAL MG/DL
KETONES UR STRIP-MCNC: NEGATIVE MG/DL
LEUKOCYTE ESTERASE UR QL STRIP: ABNORMAL
LEUKOCYTE ESTERASE UR QL STRIP: ABNORMAL
MCH RBC QN AUTO: 30.1 PG (ref 26.5–33)
MCHC RBC AUTO-ENTMCNC: 33.8 G/DL (ref 31.5–36.5)
MCV RBC AUTO: 89 FL (ref 78–100)
MUCOUS THREADS #/AREA URNS LPF: PRESENT /LPF
NITRATE UR QL: ABNORMAL
NITRATE UR QL: NEGATIVE
PH UR STRIP: 6.5 PH (ref 5–7)
PH UR STRIP: ABNORMAL PH (ref 5–7)
PLATELET # BLD AUTO: 272 10E9/L (ref 150–450)
POTASSIUM SERPL-SCNC: 3.6 MMOL/L (ref 3.4–5.3)
RBC # BLD AUTO: 3.79 10E12/L (ref 4.4–5.9)
RBC #/AREA URNS AUTO: 12 /HPF (ref 0–2)
SODIUM SERPL-SCNC: 134 MMOL/L (ref 133–144)
SOURCE: ABNORMAL
SOURCE: ABNORMAL
SP GR UR STRIP: 1.01 (ref 1–1.03)
SP GR UR STRIP: ABNORMAL (ref 1–1.03)
UROBILINOGEN UR STRIP-MCNC: ABNORMAL MG/DL (ref 0–2)
UROBILINOGEN UR STRIP-MCNC: NORMAL MG/DL (ref 0–2)
WBC # BLD AUTO: 11.6 10E9/L (ref 4–11)
WBC #/AREA URNS AUTO: 9 /HPF (ref 0–5)

## 2018-06-02 PROCEDURE — 40000239 ZZH STATISTIC VAT ROUNDS

## 2018-06-02 PROCEDURE — 25000132 ZZH RX MED GY IP 250 OP 250 PS 637: Mod: GY | Performed by: INTERNAL MEDICINE

## 2018-06-02 PROCEDURE — 85027 COMPLETE CBC AUTOMATED: CPT | Performed by: INTERNAL MEDICINE

## 2018-06-02 PROCEDURE — 80048 BASIC METABOLIC PNL TOTAL CA: CPT | Performed by: INTERNAL MEDICINE

## 2018-06-02 PROCEDURE — 40000986 XR CHEST 2 VW

## 2018-06-02 PROCEDURE — 00000146 ZZHCL STATISTIC GLUCOSE BY METER IP

## 2018-06-02 PROCEDURE — A9270 NON-COVERED ITEM OR SERVICE: HCPCS | Mod: GY | Performed by: SURGERY

## 2018-06-02 PROCEDURE — 40000257 ZZH STATISTIC CONSULT NO CHARGE VASC ACCESS

## 2018-06-02 PROCEDURE — 25000132 ZZH RX MED GY IP 250 OP 250 PS 637: Mod: GY | Performed by: PHYSICIAN ASSISTANT

## 2018-06-02 PROCEDURE — A9270 NON-COVERED ITEM OR SERVICE: HCPCS | Mod: GY | Performed by: PHYSICIAN ASSISTANT

## 2018-06-02 PROCEDURE — 87186 SC STD MICRODIL/AGAR DIL: CPT | Performed by: SURGERY

## 2018-06-02 PROCEDURE — 25000128 H RX IP 250 OP 636: Performed by: PHYSICIAN ASSISTANT

## 2018-06-02 PROCEDURE — 81001 URINALYSIS AUTO W/SCOPE: CPT | Performed by: PHYSICIAN ASSISTANT

## 2018-06-02 PROCEDURE — 81003 URINALYSIS AUTO W/O SCOPE: CPT | Performed by: PHYSICIAN ASSISTANT

## 2018-06-02 PROCEDURE — 99232 SBSQ HOSP IP/OBS MODERATE 35: CPT | Performed by: INTERNAL MEDICINE

## 2018-06-02 PROCEDURE — 25000132 ZZH RX MED GY IP 250 OP 250 PS 637: Mod: GY | Performed by: SURGERY

## 2018-06-02 PROCEDURE — 87086 URINE CULTURE/COLONY COUNT: CPT | Performed by: SURGERY

## 2018-06-02 PROCEDURE — A9270 NON-COVERED ITEM OR SERVICE: HCPCS | Mod: GY | Performed by: INTERNAL MEDICINE

## 2018-06-02 PROCEDURE — 87088 URINE BACTERIA CULTURE: CPT | Performed by: SURGERY

## 2018-06-02 RX ORDER — TAMSULOSIN HYDROCHLORIDE 0.4 MG/1
0.4 CAPSULE ORAL DAILY
Qty: 60 CAPSULE | Refills: 0 | Status: SHIPPED | OUTPATIENT
Start: 2018-06-02

## 2018-06-02 RX ORDER — METOPROLOL SUCCINATE 50 MG/1
50 TABLET, EXTENDED RELEASE ORAL DAILY
Qty: 30 TABLET | Refills: 3 | Status: SHIPPED | OUTPATIENT
Start: 2018-06-03

## 2018-06-02 RX ADMIN — ENOXAPARIN SODIUM 40 MG: 40 INJECTION SUBCUTANEOUS at 09:09

## 2018-06-02 RX ADMIN — SIMETHICONE 133 MG: 20 EMULSION ORAL at 09:09

## 2018-06-02 RX ADMIN — SENNOSIDES AND DOCUSATE SODIUM 1 TABLET: 8.6; 5 TABLET ORAL at 09:10

## 2018-06-02 RX ADMIN — RANITIDINE 150 MG: 150 TABLET ORAL at 09:10

## 2018-06-02 RX ADMIN — METOPROLOL SUCCINATE 50 MG: 50 TABLET, EXTENDED RELEASE ORAL at 09:10

## 2018-06-02 RX ADMIN — TAMSULOSIN HYDROCHLORIDE 0.4 MG: 0.4 CAPSULE ORAL at 09:10

## 2018-06-02 NOTE — PROGRESS NOTES
Pt discharged to home via private car accompanied by family. Prescription filled and sent with pt. Pt states understanding of discharge instructions including cosme care. No further cares needed.

## 2018-06-02 NOTE — PLAN OF CARE
Problem: Patient Care Overview  Goal: Plan of Care/Patient Progress Review  Outcome: No Change  A&O. VSS on RA. Tele: SD w/ PACs and PVCs  . Frequent productive cough, dyspnea and wheezing on exertion . +BS/flatus, 1700cc UOP. Incision WNL, wearing abdominal binder. Denies any pain or N/V.

## 2018-06-02 NOTE — PROGRESS NOTES
"Surgery    No complaints this morning  Breathing improved from yesterday.   + bowel sounds   Tolerating liquids and light food - had cheerios this morning.  Araya in place.     Gen:  Awake, Alert, NAD  Blood pressure 117/77, pulse 87, temperature 97.9  F (36.6  C), temperature source Oral, resp. rate 18, height 1.702 m (5' 7\"), weight 55 kg (121 lb 4.1 oz), SpO2 96 %.  Resp - clear to ascultation.    Cardiac - Regular   Abdomen - active bowel sounds/borborygmi. Soft, non tender, non distended. Incisions healing appropriately without erythema or purulent drainage.  Extremities - no lower extremity edema.    Wbc 11.6 (8.2)  Hgb 11.4 (10)  Cre 1.05 (.93)    Wt: 55kg (57.9)    A/P     - mildly elevated wbc this morning.   - patient feels ready for dc, however will check a CXR and UA for underlying source prior to discharge.  - Patient will call [phone] for follow up appointment in a week with Dr. Hernandez at [facility].  He was started on Flomax.  - He will f/u with Dr. Berg in 1-2 weeks.     Fredrick Mendez PA-C  Office: [phone]  Pager: [phone]    "

## 2018-06-02 NOTE — PROGRESS NOTES
General Surgery  Admission Date: 5/23/2018  Today's Date: 6/2/2018    Chest x-ray without acute finding, already discussed with patient. OK for patient to discharge. UA to be collected prior to discharge, will call patient if any abnormal result. Follow-up plan otherwise unchanged.        Sabrina Rosario PA-C  Office: [phone]

## 2018-06-02 NOTE — PROGRESS NOTES
Chart Check Heme/Onc    Karlos is an 82 year old man with nausea, vomiting, early satiety and weight loss. He was admitted for abdominal exploration.      1.  Duodenal adenocarcinoma, locally advanced with metastatic deposits on a small intestine   Unresectable  - Post op management per Surgery.   - chemotherapy after recovery.  - Karlos should see Dr. Siddiqi in 2-3 weeks after discharge to discuss chemotherapy

## 2018-06-02 NOTE — PROGRESS NOTES
[facility]  Hospitalist Progress Note  Hallie Serrano MD    Assessment & Plan   Hussein Hayes is a 82 year old male with PMHx significant for duodenal adenocarcinoma, HTN, COPD, and BPH who was admitted on 5/23/2018.  Hospitalist is following the patient for elevated troponin and A. fib with RVR.      Metastatic duodenal adenocarcinoma:  Postoperative ileus  Status post exploratory laparotomy with mesenteric nodule biopsy and gastrojejunostomy with lysis of adhesions on 05/23  -TPN initiated for nutrition  -Diet advancement per surgery. Currently on mechanical soft.  -Cont post-op management per surgery  -Patient is to see Dr. Siddiqi in 2-3 weeks after discharge to discuss chemotherapy     Paroxysmal atrial fibrillation with RVR,  Likely in the setting of acute illness and electrolyte abnormality. Converted back to sinus rhythm. Echocardiogram 05/28  with low EF of 35-40% and large area of septal and anteroseptal near akinesis. [id number] score calculated at 4. Started on IV  metoprolol 5 mg every 6 hrs. It was transitioned to metoprolol XL 50 mg daily 05/31.    -Cont metoprolol XL 50 mg daily  -IV metoprolol available prn  -Cont telemetry  -Continue to monitor electrolytes. Replete as indicated.  -Cardiology has now signed of, continue to monitor in telemetry     Acute demand ischemia  Cardiomyopathy- Question tachycardia induced versus ischemic,  Volume overload:  Peak TnI of 1.196, likely due to acute demand ischemia. Jasen up by 9 lbs.    Recent Labs  Lab 05/28/18  0250 05/27/18  2245 05/27/18  1800 05/27/18  1500   TROPI 0.759* 0.995* 1.125* 1.196*     Vitals:    05/31/18 0500 06/01/18 0600 06/02/18 0439   Weight: 57.4 kg (126 lb 8.7 oz) 57.9 kg (127 lb 10.3 oz) 55 kg (121 lb 4.1 oz)       -Echocardiogram 05/28 revealed large area of septal and anteroseptal near akinesis, with a EF of 35-40%, with no known cardiac history   -Cardiology consulted and given ongoing multiple medical  issues, postoperative state, lack of symptoms, and downtrending troponin, recommended no further inpatient cardiac work-up.   -Consider outpatient workup and a repeat echo for comparison based on prognosis from metastatic cancer  -Started on Lasix 20 mg bid x3 doses 06/01 for DIAZ.  -Continue metoprolol XL 50 mg daily  -Continue to monitor volume status and weight (it improved)  -Follow up on chest x-ray  -BMP in am    Acute blood loss anemia:  Hgb 13.2 and normal upon admission. Post-op it gradually fell to 10 on 05/31. Hgb currently stable.    Recent Labs  Lab 06/02/18  0510 05/31/18  0555 05/27/18  1500   HGB 11.4* 10.0* 12.7*     -Cont to monitor Hgb periodically      Nonsevere malnutrition  In the setting of weight loss up to 7.5% in the last 3 month with intake less than 75% in the last 3 months in the context of acute illness or injury  -Due to ileus patient started on TPN, dietitian following. Plan for weaning TPN     BAKARI.  Cr bumped up 1.41, but now improved and has remained stable.     Recent Labs  Lab 06/02/18  0510 06/01/18  0625 05/31/18  0555 05/30/18  0655 05/29/18  0615 05/28/18  0720   CR 1.05 0.93 0.85 0.94 1.21 1.23     -Continue to hold PTA lisinopril-HCTZ   -Avoid NSAIDs and any other nephrotoxins  -Strict I & Os and daily weights  -Cont to monitor renal function    Hyperglycemia:  Likely due to TPN. Hgb A1c 6.2%.    -No need for SSI      HTN:  PTA: Lisinopril/HCTZ 20/25 mg  daily  BP is currently controlled  -Started on metoprolol XL 50 mg daily as above  -PRN IV labetalol available  -Continue to hold lisinopril HCTZ as above     COPD:   Currently stable.  -Continue PTA Anoro Ellipta  -Xopenex nebs PRN       # Pain Assessment:  Current Pain Score 6/2/2018   Patient currently in pain? denies   Pain score (0-10) -   Pain location -   Hussein alfonso pain level was assessed and he currently denies pain.        Active Diet Order      Combination Diet Six Small Feedings Adult; Mechanical/Dental Soft Diet       Diet    DVT Prophylaxis: Lovenox  Code Status: Full code  Disposition: Pending ongoing management and hospital course. Expectant discharge per surgery service.    Addendum: D/C meds were not properly reconciled by the surgical service. Called patient and asked him to stop taking lisinopril-HCTZ. Sent a prescription for metoprolol XL 50 mg daily to his pharmacy.    D/W family 06/01.  D/W charge nurse.    Interval History   Patient feels better today and reports no dyspnea. No new event overnight.    Data reviewed today: I reviewed all new labs and imaging over the last 24 hours. I personally reviewed the telemetry monitor showing NSR.    Physical Exam   Temp: 97.3  F (36.3  C) Temp src: Oral BP: 112/72 Pulse: 89 Heart Rate: 100 Resp: 20 SpO2: 95 % O2 Device: None (Room air)    Vitals:    05/31/18 0500 06/01/18 0600 06/02/18 0439   Weight: 57.4 kg (126 lb 8.7 oz) 57.9 kg (127 lb 10.3 oz) 55 kg (121 lb 4.1 oz)     Vital Signs with Ranges  Temp:  [97.3  F (36.3  C)-98.8  F (37.1  C)] 97.3  F (36.3  C)  Pulse:  [80-89] 89  Heart Rate:  [] 100  Resp:  [16-20] 20  BP: (112-125)/(70-76) 112/72  SpO2:  [95 %-97 %] 95 %  I/O's Last 24 hours  I/O last 3 completed shifts:  In: 893 [P.O.:420]  Out: 3450 [Urine:3450]    Constitutional: Comfortable  HEENT: Mild conjunctival pallor.  Neurologic: Awake, alert, fully oriented  Neck: Supple, no elevated JVP  Respiratory: Clear to auscultation  Cardiovascular: Normal S1 and S2. Regular rhythm and rate  GI: Abdomen soft, non tender, non distended  Extremities: No calf tenderness, trace to 1+ B/L LE edema  Skin/integument: No acute rash, no cyanosis    Medications   All medications were reviewed.    IV fluid REPLACEMENT ONLY         enoxaparin  40 mg Subcutaneous Q24H     furosemide  20 mg Intravenous BID     metoprolol succinate  50 mg Oral Daily     ranitidine  150 mg Oral BID     senna-docusate  1 tablet Oral BID    Or     senna-docusate  2 tablet Oral BID     simethicone   133 mg Oral 4x Daily     sodium chloride (PF)  10 mL Intracatheter Q8H     sodium chloride (PF)  3 mL Intracatheter Q8H     tamsulosin  0.4 mg Oral Daily        Data     Recent Labs  Lab 06/02/18  0510 06/01/18  0625 05/31/18  1715 05/31/18  0555  05/29/18  0615  05/28/18  0250 05/27/18  2245 05/27/18  1800 05/27/18  1500   WBC 11.6*  --   --  8.2  --   --   --   --   --   --  12.7*   HGB 11.4*  --   --  10.0*  --   --   --   --   --   --  12.7*   MCV 89  --   --  89  --   --   --   --   --   --  91    255  --  197  --  228  --   --   --   --  288   INR  --   --   --   --   --  1.13  --   --   --   --   --     133  --  135  < > 141  < >  --   --   --  142   POTASSIUM 3.6 3.9 3.7 3.2*  < > 3.6  < >  --   --   --  4.1   CHLORIDE 94 97  --  98  < > 105  < >  --   --   --  106   CO2 30 27  --  31  < > 26  < >  --   --   --  26   BUN 30 30  --  29  < > 29  < >  --   --   --  28   CR 1.05 0.93  --  0.85  < > 1.21  < >  --   --   --  1.41*   ANIONGAP 10 9  --  6  < > 10  < >  --   --   --  10   LUBNA 8.5 8.4*  --  8.2*  < > 7.9*  < >  --   --   --  9.2   * 88  --  106*  < > 114*  < >  --   --   --  130*   ALBUMIN  --   --   --   --   --  2.6*  --   --   --   --   --    PROTTOTAL  --   --   --   --   --  5.4*  --   --   --   --   --    BILITOTAL  --   --   --   --   --  0.5  --   --   --   --   --    ALKPHOS  --   --   --   --   --  63  --   --   --   --   --    ALT  --   --   --   --   --  36  --   --   --   --   --    AST  --   --   --   --   --  32  --   --   --   --   --    TROPI  --   --   --   --   --   --   --  0.759* 0.995* 1.125* 1.196*   < > = values in this interval not displayed.    No results found for this or any previous visit (from the past 24 hour(s)).

## 2018-06-02 NOTE — PLAN OF CARE
Problem: Patient Care Overview  Goal: Plan of Care/Patient Progress Review  Outcome: No Change  A/Ox4. AVSS. Tele SD. TPN d/c'd. Araya in place, diuresing from IV lasix. LS still wheezy following walks from BR, refused hallway walks. +BS, +flatus, had BM. Denies pain. Abd binder in place. Inc WDL.

## 2018-06-03 LAB
BACTERIA SPEC CULT: ABNORMAL
Lab: ABNORMAL
SPECIMEN SOURCE: ABNORMAL

## 2018-06-04 ENCOUNTER — TELEPHONE (OUTPATIENT)
Dept: SURGERY | Facility: CLINIC | Age: 83
End: 2018-06-04

## 2018-06-04 RX ORDER — NITROFURANTOIN 25; 75 MG/1; MG/1
100 CAPSULE ORAL 2 TIMES DAILY
Qty: 14 CAPSULE | Refills: 0 | Status: SHIPPED | OUTPATIENT
Start: 2018-06-04

## 2018-06-04 RX ORDER — CIPROFLOXACIN 500 MG/1
500 TABLET, FILM COATED ORAL 2 TIMES DAILY
Qty: 14 TABLET | Refills: 0 | Status: SHIPPED | OUTPATIENT
Start: 2018-06-04 | End: 2018-06-04

## 2018-06-04 NOTE — TELEPHONE ENCOUNTER
Name of caller: daughter    Reason for Call:  Wondering how long cath has to stay in    Surgeon:  Dr. Berg    Recent Surgery:  Yes.    If yes, when & what type:  5/23/18    Best phone number to reach pt at is: [phone]  Ok to leave a message with medical info? Yes.    Pharmacy preferred (if calling for a refill):

## 2018-06-04 NOTE — TELEPHONE ENCOUNTER
Patient had abdominal exploration, mesentery biospy, and gastrojejunostomy with Dr. Berg on 05/23/2018     Patient was discharged to home with cosme catheter in place on June 2.  Patient's daughter calling today regarding cosme removal. She reports that the urologist office can't get the patient in to be seen until June 14th and the patient is currently being treated for a bladder infection. She would like Dr. Berg's input as to if it is ok to wait until the 14th for the catheter to come out.    Per d/c instructions, patient is also supposed to follow up with Dr. Berg in two weeks.  Dr. Berg will be out on vacation at that time. Will discuss with Dr. Berg if he is ok with patient waiting until the end of June to see him once he returns from vacation, or if he would like to see him prior to leaving.    Once discussed with Dr. Berg, will call patient's daughter back.    She agreed to plan.    Bridgett Lyn RN

## 2018-06-11 NOTE — DISCHARGE SUMMARY
[facility] Discharge Summary    Hussein Hayes MRN# [id number]   Age: 82 year old YOB: 1935     Date of Admission:  5/23/2018  Date of Discharge:  6/2/2018  1:18 PM  Admitting Provider:  Kenneth Berg MD  Discharge Provider:  Fredrick Mendez PA-C  Discharging Service: General Surgery     Primary Provider: Pallas, Kenneth G  Primary Care Physician Phone Number: [phone]          Admission Diagnoses:   Principle Diagnosis: DUODENAL MASS  Adenocarcinoid tumor (H)  Secondary Diagnoses:          Discharge Diagnosis:   DUODENAL ADENOCARCINOMA          Procedures:   Procedure(s): 1. Abdominal exploration.   2.  Mesenteric nodule biopsy.   3.  Gastrojejunostomy.  4.  Lysis of adhesions.            Discharge Medications:     Discharge Medication List as of 6/2/2018 12:44 PM      START taking these medications    Details   tamsulosin (FLOMAX) 0.4 MG capsule Take 1 capsule (0.4 mg) by mouth daily, Disp-60 capsule, R-0, E-Prescribe         CONTINUE these medications which have NOT CHANGED    Details   umeclidinium-vilanterol (ANORO ELLIPTA) 62.5-25 MCG/INH oral inhaler Inhale 1 puff into the lungs daily as needed , Historical      lisinopril-hydrochlorothiazide (PRINZIDE/ZESTORETIC) 20-25 MG per tablet Take 1 tablet by mouth daily, Historical                 Allergies:         Allergies   Allergen Reactions     Penicillins Hives             Brief History of Illness:   Hussein Hayes presented to [facility] for abdominal exploration of an obstructing duodenal mass.           Hospital Course:   Hussein Hayes's hospital course was unremarkable.  He developed a post op ileus and TPN was initiated. He also had urinary retention and despite multiple opportunities to void, he was discharged with a urinary catheter and leg bag. Additionally, he had episodes of atrial fibrillation with RVR which was largely controlled with beta blocker therapy.  Pain was appropriately controlled during his hospital  "stay. As bowel function returned, his diet was advanced and he was tolerating low fiber food at discharge. He was followed by the hospitalist service as well as oncology during his admission.     On the date of discharge, the patient was discharged to home in stable condition and afebrile.  He verbalized understanding of all discharge instructions and felt comfortable with the discharge plan.  He was asked to call with any further questions or concerns.         Condition on Discharge:      Discharge condition: Stable   Discharge vitals: Blood pressure 104/66, pulse 88, temperature 97.8  F (36.6  C), temperature source Oral, resp. rate 18, height 1.702 m (5' 7\"), weight 55 kg (121 lb 4.1 oz), SpO2 96 %.           Discharge Disposition:   Discharged to home          Discharge Instructions and Follow-Up:        Hussein Hayes was asked to follow up with surgical team in 1-2 weeks.  Instructions for follow up with urology were provided. He will follow up with oncology as directed.      Discharge Medications:   Discharge Medication List as of 6/2/2018 12:44 PM      START taking these medications    Details   tamsulosin (FLOMAX) 0.4 MG capsule Take 1 capsule (0.4 mg) by mouth daily, Disp-60 capsule, R-0, E-Prescribe         CONTINUE these medications which have NOT CHANGED    Details   umeclidinium-vilanterol (ANORO ELLIPTA) 62.5-25 MCG/INH oral inhaler Inhale 1 puff into the lungs daily as needed , Historical      lisinopril-hydrochlorothiazide (PRINZIDE/ZESTORETIC) 20-25 MG per tablet Take 1 tablet by mouth daily, Historical             Discharge Instructions:   Follow-up Appointments     Follow-up and recommended labs and tests        Follow up with Dr. Siddiqi at [facility] in [place] in 2-3 weeks. You   will have labs on that day and discuss options for treatment at that time.   The clinic will call you with appointment date and times.            Follow-up and recommended labs and tests        Call " [phone] for follow up appointment in a week with Dr. Hernandez at   [facility] for voiding trial.    Follow up with Dr. Berg at [facility] in about 2 weeks.  Call [phone] to   schedule an appointment.                  After Care Instructions     Activity       Avoid heavy lifting (over 20 lbs) and strenuous physical activity for 3 weeks.  Walking is encouraged.            Diet       Continue full liquid diet with protein supplements between meals for a few days. Then begin to introduce regular food.            Discharge Instructions       Take Zantac (Ranitidine) twice daily.  Take tylenol for pain. It is ok to shower, but avoid submersing the incision for 2 weeks from surgery.                          Fredrick Mendez PA-C   Office: [phone]

## 2018-06-14 ENCOUNTER — TRANSFERRED RECORDS (OUTPATIENT)
Dept: HEALTH INFORMATION MANAGEMENT | Facility: CLINIC | Age: 83
End: 2018-06-14

## 2018-06-20 ENCOUNTER — OFFICE VISIT (OUTPATIENT)
Dept: SURGERY | Facility: CLINIC | Age: 83
End: 2018-06-20
Payer: COMMERCIAL

## 2018-06-20 DIAGNOSIS — Z09 SURGERY FOLLOW-UP EXAMINATION: Primary | ICD-10-CM

## 2018-06-20 PROCEDURE — 99024 POSTOP FOLLOW-UP VISIT: CPT | Performed by: SURGERY

## 2018-06-20 NOTE — MR AVS SNAPSHOT
"              After Visit Summary   2018    Hussein Hayes    MRN: [id number]           Patient Information     Date Of Birth          1935        Visit Information        Provider Department      2018 3:15 PM Kenneth Berg MD [facility] General Surgery      Today's Diagnoses     Surgery follow-up examination    -  1       Follow-ups after your visit        Who to contact     If you have questions or need follow up information about today's clinic visit or your schedule please contact [facility] directly at [phone].  Normal or non-critical lab and imaging results will be communicated to you by [employer]hart, letter or phone within 4 business days after the clinic has received the results. If you do not hear from us within 7 days, please contact the clinic through [employer]hart or phone. If you have a critical or abnormal lab result, we will notify you by phone as soon as possible.  Submit refill requests through [employer] or call your pharmacy and they will forward the refill request to us. Please allow 3 business days for your refill to be completed.          Additional Information About Your Visit        MyChart Information     [employer] lets you send messages to your doctor, view your test results, renew your prescriptions, schedule appointments and more. To sign up, go to [email or web address] . Click on \"Log in\" on the left side of the screen, which will take you to the Welcome page. Then click on \"Sign up Now\" on the right side of the page.     You will be asked to enter the access code listed below, as well as some personal information. Please follow the directions to create your username and password.     Your access code is: [email or web address]  Expires: 2018 11:36 AM     Your access code will  in 90 days. If you need help or a new code, please call your [facility] clinic or [phone].        Care EveryWhere ID     This is " your Care EveryWhere ID. This could be used by other organizations to access your [facility] medical records  [id number]         Blood Pressure from Last 3 Encounters:   06/02/18 104/66   05/09/18 100/58   05/04/18 107/59    Weight from Last 3 Encounters:   06/02/18 121 lb 4.1 oz (55 kg)   05/09/18 118 lb (53.5 kg)   05/04/18 118 lb (53.5 kg)              Today, you had the following     No orders found for display       Primary Care Provider Office Phone # Fax #    Kenneth G Pallas, [phone]       [facility] [id number] [facility] [id number]        Equal Access to Services     ELOISA MAYER : Hadii aad quique hadasho Sojanett, waaxda luqadaha, qaybta kaalmada adeegyada, clara garrison . So [facility] [phone].    ATENCIÓN: Si habla español, tiene a coffey disposición servicios gratuitos de asistencia lingüística. [facility] [phone].    We comply with applicable federal civil rights laws and [place] laws. We do not discriminate on the basis of race, color, national origin, age, disability, sex, sexual orientation, or gender identity.            Thank you!     Thank you for choosing [facility]  for your care. Our goal is always to provide you with excellent care. Hearing back from our patients is one way we can continue to improve our services. Please take a few minutes to complete the written survey that you may receive in the mail after your visit with us. Thank you!             Your Updated Medication List - Protect others around you: Learn how to safely use, store and throw away your medicines at www.disposemymeds.org.          This list is accurate as of 6/20/18  3:59 PM.  Always use your most recent med list.                   Brand Name Dispense Instructions for use Diagnosis    metoprolol succinate 50 MG 24 hr tablet    TOPROL-XL    30 tablet    Take 1 tablet (50 mg) by mouth daily    PAF (paroxysmal atrial fibrillation) (H)        nitroFURantoin (macrocrystal-monohydrate) 100 MG capsule    MACROBID    14 capsule    Take 1 capsule (100 mg) by mouth 2 times daily    Acute cystitis without hematuria       tamsulosin 0.4 MG capsule    FLOMAX    60 capsule    Take 1 capsule (0.4 mg) by mouth daily    Urinary retention       umeclidinium-vilanterol 62.5-25 MCG/INH oral inhaler    ANORO ELLIPTA     Inhale 1 puff into the lungs daily as needed

## 2018-06-20 NOTE — LETTER
2018    Re: Hussein Hayes - 1935    First postoperative visit with Mr. Hayes after abdominal exploration with gastrojejunostomy for palliation of his non-operable duodenal adenocarcinoma.  His appetite has not increased much but he is interested in trying different food items.  He is having some bowel movements with normal brown/yellow coloration.  There has been no nausea nor vomiting.     On exam his incision has healed nicely.     He will liberalize his diet and protein intake was encouraged.  We also talked about the importance of increased activity/exercise as well as appropriate hydration.     At this time, from a surgical standpoint, he can move on with any recommended treatment from the oncology team.     All questions answered     If you have any concerns to let us know.     Best regards    Kenneth Berg MD       PREOPERATIVE DIAGNOSIS: Duodenal adenocarcinoma.   POSTOPERATIVE DIAGNOSIS: Duodenal adenocarcinoma, metastatic.  OPERATIVE PROCEDURE:   1. Abdominal exploration.   2.  Mesenteric nodule biopsy.   3.  Gastrojejunostomy.  4.  Lysis of adhesions.  ANESTHESIA: General.   ESTIMATED BLOOD LOSS: Less than 15 mL.   EVENTS: After induction of general endotracheal anesthesia,Hussein Hayes abdomen was prepped and draped in the usual sterile fashion. A midline incision was made sharply, and electrocautery dissection down to and through the abdominal wall fascia.  Visual and tactile examination of the liver and abdominal wall peritoneal surfaces revealed no evidence of metastatic disease.  Some omental adhesions to the right lower quadrant were taken down with electrocautery.  We extended our incision retracted the transverse colon cephalad, the ligament of Treitz was identified.  We could see the tumor is growing retroperitoneally and extending along the origin of the small bowel mesentery.  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  At this point seeing no benefit of additional resection, and isoperistaltic gastrojejunostomy was created with a combination of AYLEEN and TA staplers.  3-0 Vicryl sutures used for reinforcement.  NG tube was verified to be in adequate position.  The abdomen was irrigated and aspirated dry and free of debris.  The abdominal wall fascia was closed with multiple interrupted 0 Vicryl sutures. Skin was approximated with 4-0 Vicryl. Steri-Strips and sterile dressings were applied. No immediate complications. All counts correct.      RUPAL ANGUIANO MD

## 2018-06-21 NOTE — PROGRESS NOTES
First postoperative visit with Mr. Hayes after abdominal exploration with gastrojejunostomy for palliation of his non-operable duodenal adenocarcinoma.  His appetite has not increased much but he is interested in trying different food items.  He is having some bowel movements with normal brown/yellow coloration.  There has been no nausea nor vomiting.    On exam his incision has healed nicely.    He will liberalize his diet and protein intake was encouraged.  We also talked about the importance of increased activity/exercise as well as appropriate hydration.    At this time, from a surgical standpoint, he can move on with any recommended treatment from the oncology team.    All questions answered    If you have any concerns to let us know.    Best regards    Please route or send letter to:  Primary Care Provider (PCP), Referring Provider, Include Op Note, Include Path and Include Progress Note

## 2023-04-21 NOTE — BRIEF OP NOTE
[facility] Brief Operative Note    Pre-operative diagnosis: DUODENAL MASS   Post-operative diagnosis Metastatic adenocarcinoma   Procedure: Procedure(s):  ABDOMINAL EXPLORATION.  MESSENTARY BIOSPY.  GASTROJEJUNOSTOMY - Wound Class: II-Clean Contaminated     Surgeon(s): Surgeon(s) and Role:     * Kenneth Berg MD - Primary     * Fredrick Mendez PA-C - Assisting   Estimated blood loss: 10cc   Specimens:   ID Type Source Tests Collected by Time Destination   A : SMALL BOWEL MESSENTERIC NODULE Tissue Other SURGICAL PATHOLOGY EXAM Kenneth Berg MD 5/23/2018 12:24 PM       Findings: Large fixed mass on the retroperitoneum with few mesenteric tumor studs.   No complications    Fredrick Mendez PA-C  Office: [phone]  Pager: [phone]       
No

## (undated) DEVICE — SU VICRYL 0 TIE 12X18" J906G

## (undated) DEVICE — SUCTION CANISTER MEDIVAC LINER 3000ML W/LID 65651-530

## (undated) DEVICE — SU VICRYL 2-0 TIE 12X18" J905T

## (undated) DEVICE — DRSG TELFA ISLAND 4X10"

## (undated) DEVICE — STPL LINEAR 45 X 3.5MM TA4535S

## (undated) DEVICE — SU VICRYL 3-0 SH CR 8X18" J774

## (undated) DEVICE — DRSG STERI STRIP 1/2X4" R1547

## (undated) DEVICE — DRAPE LAP W/ARMBOARD 29410

## (undated) DEVICE — GLOVE PROTEXIS BLUE W/NEU-THERA 7.5  2D73EB75

## (undated) DEVICE — ENDO FORCEP ENDOJAW BIOPSY 2.8MMX230CM FB-220U

## (undated) DEVICE — LINEN TOWEL PACK X5 5464

## (undated) DEVICE — BAG CLEAR TRASH 1.3M 39X33" P4040C

## (undated) DEVICE — PREP CHLORAPREP 26ML TINTED ORANGE  260815

## (undated) DEVICE — STPL 80 X 3.8MM GIA8038S

## (undated) DEVICE — SPONGE BALL KERLIX ROUND XL W/O STRING LATEX 4935

## (undated) DEVICE — TUBING SUCTION 12"X1/4" N612

## (undated) DEVICE — SU VICRYL 0 CT-1 CR 8X18" J740D

## (undated) DEVICE — BAG RED BIOHAZARD 30.5X43" G4300XR

## (undated) DEVICE — PAD CHUX UNDERPAD 23X24" 7136

## (undated) DEVICE — PACK MAJOR SBA15MAFSI

## (undated) DEVICE — BLADE KNIFE SURG 15 371115

## (undated) DEVICE — Device

## (undated) DEVICE — KIT PROCEDURE W/CLEAN-A-SCOPE LINERS V2 200800

## (undated) DEVICE — SU VICRYL 4-0 PS-2 18" UND J496H

## (undated) DEVICE — SOL WATER IRRIG 1000ML BOTTLE 2F7114

## (undated) DEVICE — ESU GROUND PAD UNIVERSAL W/O CORD

## (undated) DEVICE — GLOVE PROTEXIS MICRO 7.5  2D73PM75

## (undated) DEVICE — SU VICRYL 3-0 SH 27" J316H

## (undated) DEVICE — GOWN XLG DISP 9545

## (undated) RX ORDER — GLYCOPYRROLATE 0.2 MG/ML
INJECTION, SOLUTION INTRAMUSCULAR; INTRAVENOUS
Status: DISPENSED
Start: 2018-05-23

## (undated) RX ORDER — CIPROFLOXACIN 2 MG/ML
INJECTION, SOLUTION INTRAVENOUS
Status: DISPENSED
Start: 2018-05-23

## (undated) RX ORDER — PROPOFOL 10 MG/ML
INJECTION, EMULSION INTRAVENOUS
Status: DISPENSED
Start: 2018-05-04

## (undated) RX ORDER — DEXAMETHASONE SODIUM PHOSPHATE 4 MG/ML
INJECTION, SOLUTION INTRA-ARTICULAR; INTRALESIONAL; INTRAMUSCULAR; INTRAVENOUS; SOFT TISSUE
Status: DISPENSED
Start: 2018-05-04

## (undated) RX ORDER — LIDOCAINE HYDROCHLORIDE 10 MG/ML
INJECTION, SOLUTION INFILTRATION; PERINEURAL
Status: DISPENSED
Start: 2018-05-23

## (undated) RX ORDER — ACETAMINOPHEN 325 MG/1
TABLET ORAL
Status: DISPENSED
Start: 2018-05-23

## (undated) RX ORDER — EPHEDRINE SULFATE 50 MG/ML
INJECTION, SOLUTION INTRAMUSCULAR; INTRAVENOUS; SUBCUTANEOUS
Status: DISPENSED
Start: 2018-05-04

## (undated) RX ORDER — EPINEPHRINE 1 MG/ML
INJECTION, SOLUTION INTRAMUSCULAR; SUBCUTANEOUS
Status: DISPENSED
Start: 2018-05-23

## (undated) RX ORDER — DEXAMETHASONE SODIUM PHOSPHATE 4 MG/ML
INJECTION, SOLUTION INTRA-ARTICULAR; INTRALESIONAL; INTRAMUSCULAR; INTRAVENOUS; SOFT TISSUE
Status: DISPENSED
Start: 2018-05-23

## (undated) RX ORDER — FENTANYL CITRATE 50 UG/ML
INJECTION, SOLUTION INTRAMUSCULAR; INTRAVENOUS
Status: DISPENSED
Start: 2018-05-23

## (undated) RX ORDER — FENTANYL CITRATE 50 UG/ML
INJECTION, SOLUTION INTRAMUSCULAR; INTRAVENOUS
Status: DISPENSED
Start: 2018-05-04

## (undated) RX ORDER — KETAMINE HYDROCHLORIDE 10 MG/ML
INJECTION INTRAMUSCULAR; INTRAVENOUS
Status: DISPENSED
Start: 2018-05-04

## (undated) RX ORDER — ONDANSETRON 2 MG/ML
INJECTION INTRAMUSCULAR; INTRAVENOUS
Status: DISPENSED
Start: 2018-05-23

## (undated) RX ORDER — BUPIVACAINE HYDROCHLORIDE 2.5 MG/ML
INJECTION, SOLUTION EPIDURAL; INFILTRATION; INTRACAUDAL
Status: DISPENSED
Start: 2018-05-23

## (undated) RX ORDER — ONDANSETRON 2 MG/ML
INJECTION INTRAMUSCULAR; INTRAVENOUS
Status: DISPENSED
Start: 2018-05-04

## (undated) RX ORDER — PROPOFOL 10 MG/ML
INJECTION, EMULSION INTRAVENOUS
Status: DISPENSED
Start: 2018-05-23

## (undated) RX ORDER — LIDOCAINE HYDROCHLORIDE 20 MG/ML
INJECTION, SOLUTION EPIDURAL; INFILTRATION; INTRACAUDAL; PERINEURAL
Status: DISPENSED
Start: 2018-05-23